# Patient Record
Sex: MALE | Race: BLACK OR AFRICAN AMERICAN | NOT HISPANIC OR LATINO | Employment: OTHER | RURAL
[De-identification: names, ages, dates, MRNs, and addresses within clinical notes are randomized per-mention and may not be internally consistent; named-entity substitution may affect disease eponyms.]

---

## 2022-11-28 ENCOUNTER — HOSPITAL ENCOUNTER (EMERGENCY)
Facility: HOSPITAL | Age: 62
Discharge: HOME | End: 2022-11-28
Payer: COMMERCIAL

## 2022-11-28 VITALS
WEIGHT: 185 LBS | HEIGHT: 70 IN | DIASTOLIC BLOOD PRESSURE: 79 MMHG | RESPIRATION RATE: 19 BRPM | TEMPERATURE: 98 F | BODY MASS INDEX: 26.48 KG/M2 | OXYGEN SATURATION: 95 % | SYSTOLIC BLOOD PRESSURE: 103 MMHG | HEART RATE: 87 BPM

## 2022-11-28 DIAGNOSIS — T18.128A ESOPHAGEAL OBSTRUCTION DUE TO FOOD IMPACTION: Primary | ICD-10-CM
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
## 2022-11-28 PROCEDURE — 63600175 PHARM REV CODE 636 W HCPCS: Performed by: NURSE PRACTITIONER

## 2022-11-28 PROCEDURE — 36000 PLACE NEEDLE IN VEIN: CPT

## 2022-11-28 PROCEDURE — 25000003 PHARM REV CODE 250: Performed by: NURSE PRACTITIONER

## 2022-11-28 PROCEDURE — 99284 EMERGENCY DEPT VISIT MOD MDM: CPT | Mod: ,,, | Performed by: NURSE PRACTITIONER

## 2022-11-28 PROCEDURE — 96372 THER/PROPH/DIAG INJ SC/IM: CPT | Performed by: NURSE PRACTITIONER

## 2022-11-28 PROCEDURE — 99284 EMERGENCY DEPT VISIT MOD MDM: CPT | Mod: 25

## 2022-11-28 PROCEDURE — 99284 PR EMERGENCY DEPT VISIT,LEVEL IV: ICD-10-PCS | Mod: ,,, | Performed by: NURSE PRACTITIONER

## 2022-11-28 RX ORDER — MAG HYDROX/ALUMINUM HYD/SIMETH 200-200-20
30 SUSPENSION, ORAL (FINAL DOSE FORM) ORAL ONCE
Status: COMPLETED | OUTPATIENT
Start: 2022-11-28 | End: 2022-11-28

## 2022-11-28 RX ORDER — GLUCAGON 1 MG
1 KIT INJECTION
Status: COMPLETED | OUTPATIENT
Start: 2022-11-28 | End: 2022-11-28

## 2022-11-28 RX ORDER — LOSARTAN POTASSIUM 100 MG/1
100 TABLET ORAL
COMMUNITY
End: 2024-03-04

## 2022-11-28 RX ORDER — MELOXICAM 7.5 MG/1
7.5 TABLET ORAL
COMMUNITY

## 2022-11-28 RX ORDER — NITROFURANTOIN 25; 75 MG/1; MG/1
50 CAPSULE ORAL DAILY
COMMUNITY

## 2022-11-28 RX ORDER — TAMSULOSIN HYDROCHLORIDE 0.4 MG/1
1 CAPSULE ORAL DAILY
COMMUNITY

## 2022-11-28 RX ORDER — HYDROCHLOROTHIAZIDE 25 MG/1
25 TABLET ORAL
COMMUNITY
End: 2024-03-04

## 2022-11-28 RX ORDER — LIDOCAINE HYDROCHLORIDE 20 MG/ML
15 SOLUTION OROPHARYNGEAL ONCE
Status: COMPLETED | OUTPATIENT
Start: 2022-11-28 | End: 2022-11-28

## 2022-11-28 RX ORDER — ATORVASTATIN CALCIUM 10 MG/1
10 TABLET, FILM COATED ORAL DAILY
COMMUNITY
End: 2023-08-28 | Stop reason: ALTCHOICE

## 2022-11-28 RX ORDER — ASPIRIN 81 MG/1
81 TABLET ORAL 2 TIMES DAILY
COMMUNITY

## 2022-11-28 RX ADMIN — LIDOCAINE HYDROCHLORIDE 15 ML: 20 SOLUTION ORAL at 06:11

## 2022-11-28 RX ADMIN — GLUCAGON HYDROCHLORIDE 1 MG: 1 INJECTION, POWDER, FOR SOLUTION INTRAMUSCULAR; INTRAVENOUS; SUBCUTANEOUS at 06:11

## 2022-11-28 RX ADMIN — ALUMINUM HYDROXIDE, MAGNESIUM HYDROXIDE, AND SIMETHICONE 30 ML: 200; 200; 20 SUSPENSION ORAL at 06:11

## 2022-11-29 DIAGNOSIS — R19.8 ABNORMAL FINDINGS ON ESOPHAGOGASTRODUODENOSCOPY (EGD): Primary | ICD-10-CM

## 2022-11-29 NOTE — ED PROVIDER NOTES
Encounter Date: 11/28/2022       History     Chief Complaint   Patient presents with    Foreign Body In Throat     Reports feeling like something is stuck in throat since choking while eating around 9pm last night. States unable to tolerate anything by mouth. No drooling/stridor at triage.     Patient presents to ER with complaint of food stuck in esophagus.   Patient state he feels like turkey or ham got stuck in his throat around 9 pm last night.  He states he has not been able to tolerate food or liquid since that time.  He is able to swallow saliva but has been nauseated.  He has vomited several times today.  Denies fever.  Reports similar symptoms but has never had to be treated or evaluated.      The history is provided by the patient and the spouse. No  was used.   Review of patient's allergies indicates:   Allergen Reactions    Levofloxacin in d5w Shortness Of Breath     No past medical history on file.  No past surgical history on file.  No family history on file.     Review of Systems   Constitutional:  Positive for activity change, appetite change and fatigue.   HENT:  Positive for trouble swallowing.    All other systems reviewed and are negative.    Physical Exam     Initial Vitals [11/28/22 1752]   BP Pulse Resp Temp SpO2   (!) 130/93 93 16 97.6 °F (36.4 °C) 95 %      MAP       --         Physical Exam    Nursing note and vitals reviewed.  Constitutional: Vital signs are normal. He appears well-developed and well-nourished. He is cooperative. He has a sickly appearance.   HENT:   Head: Normocephalic.   Right Ear: Hearing, tympanic membrane, external ear and ear canal normal.   Left Ear: Hearing, tympanic membrane, external ear and ear canal normal.   Nose: Nose normal.   Mouth/Throat: Uvula is midline and mucous membranes are normal. Posterior oropharyngeal erythema present.   Eyes: Conjunctivae and EOM are normal. Pupils are equal, round, and reactive to light.   Neck: Neck supple.    Normal range of motion.  Cardiovascular:  Normal rate, regular rhythm, normal heart sounds and intact distal pulses.           Pulmonary/Chest: Breath sounds normal.   Abdominal: Abdomen is soft and flat. Bowel sounds are normal. There is abdominal tenderness.   Musculoskeletal:         General: Normal range of motion.      Cervical back: Normal range of motion and neck supple.     Neurological: He is alert and oriented to person, place, and time. GCS score is 15. GCS eye subscore is 4. GCS verbal subscore is 5. GCS motor subscore is 6.   Skin: Skin is warm and dry. Capillary refill takes less than 2 seconds.   Psychiatric: He has a normal mood and affect. His behavior is normal. Judgment and thought content normal.       Medical Screening Exam   See Full Note    ED Course   Procedures  Labs Reviewed - No data to display       Imaging Results    None          Medications   glucagon (human recombinant) injection 1 mg (1 mg Intramuscular Given 11/28/22 1839)   aluminum-magnesium hydroxide-simethicone 200-200-20 mg/5 mL suspension 30 mL (30 mLs Oral Given 11/28/22 1840)     And   LIDOcaine HCl 2% oral solution 15 mL (15 mLs Oral Given 11/28/22 1840)     Medical Decision Making:   ED Management:  1mg glucagon given IV push, followed by mouth full of hot diet coke.  Patient's hands were raised above his head.  He was able to swallow drink.  Voiced relief of symptoms immediately.   1841 Dr Freed was called in consult  Spoke to Dr Freed.  Recommendation for referral, will work in for egd hopefully this week if insurance will allow.   Other:   I have discussed this case with another health care provider.       <> Summary of the Discussion: Discussed with Dr Freed.                 Clinical Impression:   Final diagnoses:  [K22.2, T18.128A] Esophageal obstruction due to food impaction (Primary)      ED Disposition Condition    Discharge Stable          ED Prescriptions    None       Follow-up Information       Follow up  With Specialties Details Why Contact Info    Fei Freed MD Gastroenterology Schedule an appointment as soon as possible for a visit   [address] [place] [id number]  [phone]               DEBI Kendrick  11/28/22 1909

## 2022-11-29 NOTE — DISCHARGE INSTRUCTIONS
Call Dr Freed's office in AM to schedule EGD with dilation.   Referral was sent from the ER.  Liquids or soft/ puree foods only until follow up. \  Return to ER with new or worsening symptoms.

## 2022-12-01 ENCOUNTER — ANESTHESIA (OUTPATIENT)
Dept: GASTROENTEROLOGY | Facility: HOSPITAL | Age: 62
End: 2022-12-01
Payer: COMMERCIAL

## 2022-12-01 ENCOUNTER — ANESTHESIA EVENT (OUTPATIENT)
Dept: GASTROENTEROLOGY | Facility: HOSPITAL | Age: 62
End: 2022-12-01
Payer: COMMERCIAL

## 2022-12-01 ENCOUNTER — HOSPITAL ENCOUNTER (OUTPATIENT)
Dept: GASTROENTEROLOGY | Facility: HOSPITAL | Age: 62
Discharge: HOME | End: 2022-12-01
Attending: INTERNAL MEDICINE
Payer: COMMERCIAL

## 2022-12-01 VITALS
TEMPERATURE: 98 F | RESPIRATION RATE: 18 BRPM | HEART RATE: 56 BPM | HEIGHT: 70 IN | SYSTOLIC BLOOD PRESSURE: 147 MMHG | WEIGHT: 180 LBS | DIASTOLIC BLOOD PRESSURE: 89 MMHG | BODY MASS INDEX: 25.77 KG/M2 | OXYGEN SATURATION: 98 %

## 2022-12-01 DIAGNOSIS — K21.00 GASTROESOPHAGEAL REFLUX DISEASE WITH ESOPHAGITIS WITHOUT HEMORRHAGE: Primary | ICD-10-CM

## 2022-12-01 DIAGNOSIS — R13.19 ESOPHAGEAL DYSPHAGIA: ICD-10-CM

## 2022-12-01 DIAGNOSIS — R19.8 ABNORMAL FINDINGS ON ESOPHAGOGASTRODUODENOSCOPY (EGD): ICD-10-CM

## 2022-12-01 PROCEDURE — 43239 EGD BIOPSY SINGLE/MULTIPLE: CPT | Mod: 59 | Performed by: INTERNAL MEDICINE

## 2022-12-01 PROCEDURE — 88305 TISSUE EXAM BY PATHOLOGIST: CPT | Mod: TC,SUR | Performed by: INTERNAL MEDICINE

## 2022-12-01 PROCEDURE — 88312 SURGICAL PATHOLOGY: ICD-10-PCS | Mod: 26,,, | Performed by: PATHOLOGY

## 2022-12-01 PROCEDURE — 43249 ESOPH EGD DILATION <30 MM: CPT | Mod: ,,, | Performed by: INTERNAL MEDICINE

## 2022-12-01 PROCEDURE — 88305 TISSUE EXAM BY PATHOLOGIST: CPT | Mod: 26,,, | Performed by: PATHOLOGY

## 2022-12-01 PROCEDURE — D9220A PRA ANESTHESIA: ICD-10-PCS | Mod: ,,, | Performed by: NURSE ANESTHETIST, CERTIFIED REGISTERED

## 2022-12-01 PROCEDURE — 63600175 PHARM REV CODE 636 W HCPCS: Performed by: NURSE ANESTHETIST, CERTIFIED REGISTERED

## 2022-12-01 PROCEDURE — 25000003 PHARM REV CODE 250: Performed by: NURSE ANESTHETIST, CERTIFIED REGISTERED

## 2022-12-01 PROCEDURE — 43239 PR EGD, FLEX, W/BIOPSY, SGL/MULTI: ICD-10-PCS | Mod: 59,,, | Performed by: INTERNAL MEDICINE

## 2022-12-01 PROCEDURE — 37000008 HC ANESTHESIA 1ST 15 MINUTES

## 2022-12-01 PROCEDURE — 88305 SURGICAL PATHOLOGY: ICD-10-PCS | Mod: 26,,, | Performed by: PATHOLOGY

## 2022-12-01 PROCEDURE — 43249 ESOPH EGD DILATION <30 MM: CPT | Performed by: INTERNAL MEDICINE

## 2022-12-01 PROCEDURE — D9220A PRA ANESTHESIA: Mod: ,,, | Performed by: NURSE ANESTHETIST, CERTIFIED REGISTERED

## 2022-12-01 PROCEDURE — 88312 SPECIAL STAINS GROUP 1: CPT | Mod: 26,,, | Performed by: PATHOLOGY

## 2022-12-01 PROCEDURE — 27000284 HC CANNULA NASAL: Performed by: NURSE ANESTHETIST, CERTIFIED REGISTERED

## 2022-12-01 PROCEDURE — 37000009 HC ANESTHESIA EA ADD 15 MINS

## 2022-12-01 PROCEDURE — 43249 PR EGD, FLEX, W/BALL DILATION, < 30MM: ICD-10-PCS | Mod: ,,, | Performed by: INTERNAL MEDICINE

## 2022-12-01 PROCEDURE — 43239 EGD BIOPSY SINGLE/MULTIPLE: CPT | Mod: 59,,, | Performed by: INTERNAL MEDICINE

## 2022-12-01 PROCEDURE — C1726 CATH, BAL DIL, NON-VASCULAR: HCPCS

## 2022-12-01 RX ORDER — OMEPRAZOLE 40 MG/1
40 CAPSULE, DELAYED RELEASE ORAL DAILY
Qty: 90 CAPSULE | Refills: 3 | Status: SHIPPED | OUTPATIENT
Start: 2022-12-01 | End: 2024-03-04

## 2022-12-01 RX ORDER — LIDOCAINE HYDROCHLORIDE 20 MG/ML
INJECTION, SOLUTION EPIDURAL; INFILTRATION; INTRACAUDAL; PERINEURAL
Status: DISCONTINUED | OUTPATIENT
Start: 2022-12-01 | End: 2022-12-01

## 2022-12-01 RX ORDER — PROPOFOL 10 MG/ML
VIAL (ML) INTRAVENOUS
Status: DISCONTINUED | OUTPATIENT
Start: 2022-12-01 | End: 2022-12-01

## 2022-12-01 RX ADMIN — PROPOFOL 50 MG: 10 INJECTION, EMULSION INTRAVENOUS at 08:12

## 2022-12-01 RX ADMIN — SODIUM CHLORIDE: 9 INJECTION, SOLUTION INTRAVENOUS at 08:12

## 2022-12-01 RX ADMIN — LIDOCAINE HYDROCHLORIDE 50 MG: 20 INJECTION, SOLUTION INTRAVENOUS at 08:12

## 2022-12-01 NOTE — H&P
"Gastroenterology Pre-procedure H&P    Chief Complaint: Esophageal dysphagia    History of Present Illness    Bradly Coffey is a 62 y.o. male that  has a past medical history of Arthritis and Hypertension. Patient presented to ER earlier this week for a likely food bolus (points to xiphoid) that passed after glucagon and physical maneuvers.     Past Medical History:   Diagnosis Date    Arthritis     Hypertension        Past Surgical History:   Procedure Laterality Date    RENAL BIOPSY         History reviewed. No pertinent family history.    Social History     Socioeconomic History    Marital status:    Tobacco Use    Smoking status: Never    Smokeless tobacco: Current     Types: Chew   Substance and Sexual Activity    Alcohol use: Yes     Comment: 6 pack every 2 weeks    Drug use: Never       Current Outpatient Medications   Medication Sig Dispense Refill    aspirin (ECOTRIN) 81 MG EC tablet Take 81 mg by mouth.      atorvastatin (LIPITOR) 10 MG tablet Take 10 mg by mouth once daily.      hydroCHLOROthiazide (HYDRODIURIL) 25 MG tablet Take 25 mg by mouth.      losartan (COZAAR) 100 MG tablet Take 100 mg by mouth.      meloxicam (MOBIC) 7.5 MG tablet Take 7.5 mg by mouth.      nitrofurantoin, macrocrystal-monohydrate, (MACROBID) 100 MG capsule Take 50 mg by mouth once daily.      tamsulosin (FLOMAX) 0.4 mg Cap Take 1 capsule by mouth once daily.       No current facility-administered medications for this encounter.       Review of patient's allergies indicates:   Allergen Reactions    Levofloxacin in d5w Shortness Of Breath       Objective:  Vitals:    12/01/22 0724   BP: (!) 156/81   Pulse: 66   Resp: 12   SpO2: 97%   Weight: 81.6 kg (180 lb)   Height: 5' 10" (1.778 m)        GEN: normal appearing, NAD, AAO x3  HENT: NCAT, anicteric, OP benign  CV: normal rate, regular rhythm  RESP: NABS, symmetric rise, unlabored  ABD: soft, ND, no guarding or TTP  SKIN: warm and dry  NEURO: grossly afocal    Assessment " and Plan:    Proceed with:    EGD for esophageal dysphagia     Fei Freed MD  Gastroenterology

## 2022-12-01 NOTE — DISCHARGE INSTRUCTIONS
Procedure Date  12/1/22     Impression  Overall Impression:   - Benign appearing, likely peptic stricture in the lower esophagus, dilated to 13.5-mm; biopsied   - The esophagus, stomach, and examined duodenum were otherwise normal  - No mass, hiatal hernia, peptic ulcer     Recommendation    Await pathology results  Start Prilosec 40 mg daily - indefinitely      Schedule repeat EGD in the next 2-3 weeks for repeat dilation      Outcome of procedure: successful EGD   Disposition: patient to recovery following procedure; discharge to home when appropriate parameters met  Provisions for follow up: please call my office for any unexpected symptoms like chest or abdominal pain or bleeding following your procedure.  Final Diagnosis: GERD, peptic stricture    THE NURSE WILL CALL YOU WITH YOUR BIOPSY RESULTS IN A FEW DAYS.     NO DRIVING, OPERATING EQUIPMENT, OR SIGNING LEGAL DOCUMENTS FOR 24 HOURS.

## 2022-12-01 NOTE — ANESTHESIA PREPROCEDURE EVALUATION
12/01/2022  Bradly Coffey is a 62 y.o., male.      Pre-op Assessment    I have reviewed the Patient Summary Reports.     I have reviewed the Nursing Notes. I have reviewed the NPO Status.   I have reviewed the Medications.     Review of Systems  Anesthesia Hx:  No problems with previous Anesthesia Denies Hx of Anesthetic complications  Denies Family Hx of Anesthesia complications.   Denies Personal Hx of Anesthesia complications.   Social:  Non-Smoker    Hematology/Oncology:  Hematology Normal   Oncology Normal     EENT/Dental:EENT/Dental Normal   Cardiovascular:   Hypertension ECG has been reviewed.    Pulmonary:  Pulmonary Normal    Renal/:  Renal/ Normal     Musculoskeletal:  Musculoskeletal Normal    Neurological:  Neurology Normal    Dermatological:  Skin Normal    Psych:  Psychiatric Normal           Physical Exam  General: Well nourished    Airway:  Mallampati: II   Mouth Opening: Normal  TM Distance: Normal  Tongue: Normal  Neck ROM: Normal ROM    Dental:  Intact        Anesthesia Plan  Type of Anesthesia, risks & benefits discussed:    Anesthesia Type: Gen Natural Airway  Intra-op Monitoring Plan: Standard ASA Monitors  Post Op Pain Control Plan: multimodal analgesia  Induction:  IV  Informed Consent: Informed consent signed with the Patient and all parties understand the risks and agree with anesthesia plan.  All questions answered. Patient consented to blood products? Yes  ASA Score: 2  Day of Surgery Review of History & Physical: H&P Update referred to the surgeon/provider.I have interviewed and examined the patient. I have reviewed the patient's H&P dated: There are no significant changes.     Ready For Surgery From Anesthesia Perspective.     .

## 2022-12-01 NOTE — ANESTHESIA POSTPROCEDURE EVALUATION
Anesthesia Post Evaluation    Patient: Bradly Coffey    Procedure(s) Performed: EGD  Final Anesthesia Type: general      Patient location during evaluation: GI PACU  Patient participation: Yes- Able to Participate  Level of consciousness: awake and alert and oriented  Post-procedure vital signs: reviewed and stable  Pain management: adequate  Airway patency: patent    PONV status at discharge: No PONV  Anesthetic complications: no      Cardiovascular status: blood pressure returned to baseline and stable  Respiratory status: unassisted, spontaneous ventilation and room air  Hydration status: euvolemic  Follow-up not needed.          Vitals Value Taken Time   /78 12/01/22 0838   Temp 36.6 12/01/22 0838   Pulse 60 12/01/22 0838   Resp 16 12/01/22 0838   SpO2 97 12/01/22 0838         No case tracking events are documented in the log.      Pain/Bonita Score: No data recorded

## 2022-12-01 NOTE — TRANSFER OF CARE
"Anesthesia Transfer of Care Note    Patient: Bradly Coffey    Procedure(s) Performed: EGD    Patient location: GI    Anesthesia Type: general    Transport from OR: Transported from OR on room air with adequate spontaneous ventilation. Continuous ECG monitoring in transport. Continuous SpO2 monitoring in transport    Post pain: adequate analgesia    Post assessment: no apparent anesthetic complications    Post vital signs: stable    Level of consciousness: responds to stimulation, awake and sedated    Nausea/Vomiting: no nausea/vomiting    Complications: none    Transfer of care protocol was followed      Last vitals:   Visit Vitals  /78 (BP Location: Left arm, Patient Position: Lying)   Pulse 62   Temp 36.6 °C (97.9 °F) (Oral)   Resp 16   Ht 5' 10" (1.778 m)   Wt 81.6 kg (180 lb)   SpO2 97%   BMI 25.83 kg/m²     "

## 2022-12-02 LAB
ESTROGEN SERPL-MCNC: NORMAL PG/ML
INSULIN SERPL-ACNC: NORMAL U[IU]/ML
LAB AP GROSS DESCRIPTION: NORMAL
LAB AP LABORATORY NOTES: NORMAL
T3RU NFR SERPL: NORMAL %

## 2022-12-13 ENCOUNTER — ANESTHESIA EVENT (OUTPATIENT)
Dept: GASTROENTEROLOGY | Facility: HOSPITAL | Age: 62
End: 2022-12-13
Payer: COMMERCIAL

## 2022-12-13 ENCOUNTER — HOSPITAL ENCOUNTER (OUTPATIENT)
Dept: GASTROENTEROLOGY | Facility: HOSPITAL | Age: 62
Discharge: HOME | End: 2022-12-13
Attending: INTERNAL MEDICINE
Payer: COMMERCIAL

## 2022-12-13 ENCOUNTER — ANESTHESIA (OUTPATIENT)
Dept: GASTROENTEROLOGY | Facility: HOSPITAL | Age: 62
End: 2022-12-13
Payer: COMMERCIAL

## 2022-12-13 VITALS
SYSTOLIC BLOOD PRESSURE: 104 MMHG | HEART RATE: 62 BPM | OXYGEN SATURATION: 94 % | TEMPERATURE: 98 F | DIASTOLIC BLOOD PRESSURE: 63 MMHG | RESPIRATION RATE: 19 BRPM

## 2022-12-13 DIAGNOSIS — K22.2 BENIGN ESOPHAGEAL STRICTURE: ICD-10-CM

## 2022-12-13 PROCEDURE — 63600175 PHARM REV CODE 636 W HCPCS: Performed by: NURSE ANESTHETIST, CERTIFIED REGISTERED

## 2022-12-13 PROCEDURE — D9220A PRA ANESTHESIA: ICD-10-PCS | Mod: ,,, | Performed by: NURSE ANESTHETIST, CERTIFIED REGISTERED

## 2022-12-13 PROCEDURE — 43249 ESOPH EGD DILATION <30 MM: CPT | Mod: ,,, | Performed by: INTERNAL MEDICINE

## 2022-12-13 PROCEDURE — 43249 PR EGD, FLEX, W/BALL DILATION, < 30MM: ICD-10-PCS | Mod: ,,, | Performed by: INTERNAL MEDICINE

## 2022-12-13 PROCEDURE — 27000284 HC CANNULA NASAL: Performed by: NURSE ANESTHETIST, CERTIFIED REGISTERED

## 2022-12-13 PROCEDURE — 37000008 HC ANESTHESIA 1ST 15 MINUTES

## 2022-12-13 PROCEDURE — 43249 ESOPH EGD DILATION <30 MM: CPT | Performed by: INTERNAL MEDICINE

## 2022-12-13 PROCEDURE — D9220A PRA ANESTHESIA: Mod: ,,, | Performed by: NURSE ANESTHETIST, CERTIFIED REGISTERED

## 2022-12-13 PROCEDURE — 25000003 PHARM REV CODE 250: Performed by: NURSE ANESTHETIST, CERTIFIED REGISTERED

## 2022-12-13 PROCEDURE — C1726 CATH, BAL DIL, NON-VASCULAR: HCPCS

## 2022-12-13 RX ORDER — PROPOFOL 10 MG/ML
VIAL (ML) INTRAVENOUS
Status: DISCONTINUED | OUTPATIENT
Start: 2022-12-13 | End: 2022-12-13

## 2022-12-13 RX ORDER — LIDOCAINE HYDROCHLORIDE 20 MG/ML
INJECTION INTRAVENOUS
Status: DISCONTINUED | OUTPATIENT
Start: 2022-12-13 | End: 2022-12-13

## 2022-12-13 RX ORDER — FENTANYL CITRATE 50 UG/ML
INJECTION, SOLUTION INTRAMUSCULAR; INTRAVENOUS
Status: DISCONTINUED | OUTPATIENT
Start: 2022-12-13 | End: 2022-12-13

## 2022-12-13 RX ADMIN — LIDOCAINE HYDROCHLORIDE 50 MG: 20 INJECTION, SOLUTION INTRAVENOUS at 09:12

## 2022-12-13 RX ADMIN — PROPOFOL 25 MG: 10 INJECTION, EMULSION INTRAVENOUS at 10:12

## 2022-12-13 RX ADMIN — SODIUM CHLORIDE: 9 INJECTION, SOLUTION INTRAVENOUS at 09:12

## 2022-12-13 RX ADMIN — PROPOFOL 50 MG: 10 INJECTION, EMULSION INTRAVENOUS at 09:12

## 2022-12-13 RX ADMIN — FENTANYL CITRATE 100 MCG: 50 INJECTION INTRAMUSCULAR; INTRAVENOUS at 09:12

## 2022-12-13 RX ADMIN — PROPOFOL 25 MG: 10 INJECTION, EMULSION INTRAVENOUS at 09:12

## 2022-12-13 NOTE — TRANSFER OF CARE
Anesthesia Transfer of Care Note    Patient: Bradly Coffey    Procedure(s) Performed: EGD with dilation    Patient location: PACU    Anesthesia Type: general    Transport from OR: Transported from OR on room air with adequate spontaneous ventilation    Post pain: adequate analgesia    Post assessment: no apparent anesthetic complications    Post vital signs: stable    Level of consciousness: sedated    Nausea/Vomiting: no nausea/vomiting    Complications: none    Transfer of care protocol was followed      Last vitals:   Visit Vitals  /72 (Patient Position: Lying)   Pulse 66   Temp 36.6 °C (97.8 °F) (Skin)   Resp 17   SpO2 97%

## 2022-12-13 NOTE — ANESTHESIA PREPROCEDURE EVALUATION
12/13/2022  Bradly Coffey is a 62 y.o., male.      Pre-op Assessment    I have reviewed the Patient Summary Reports.     I have reviewed the Nursing Notes. I have reviewed the NPO Status.   I have reviewed the Medications.     Review of Systems  Anesthesia Hx:  No problems with previous Anesthesia  Denies Family Hx of Anesthesia complications.   Denies Personal Hx of Anesthesia complications.   Social:  Non-Smoker    Hematology/Oncology:  Hematology Normal   Oncology Normal     EENT/Dental:EENT/Dental Normal   Cardiovascular:   Hypertension hyperlipidemia ECG has been reviewed.    Pulmonary:  Pulmonary Normal    Renal/:   BPH    Musculoskeletal:  Musculoskeletal Normal    Neurological:  Neurology Normal    Dermatological:  Skin Normal    Psych:  Psychiatric Normal           Physical Exam  General: Well nourished, Cooperative, Alert and Oriented    Airway:  Mallampati: II   Mouth Opening: Normal  TM Distance: Normal  Tongue: Normal  Neck ROM: Normal ROM    Dental:  Intact    Chest/Lungs:  Normal Respiratory Rate    Heart:  Rate: Normal  Rhythm: Regular Rhythm        Anesthesia Plan  Type of Anesthesia, risks & benefits discussed:    Anesthesia Type: Gen Natural Airway  Intra-op Monitoring Plan: Standard ASA Monitors  Post Op Pain Control Plan: multimodal analgesia  Induction:  IV  Informed Consent: Informed consent signed with the Patient and all parties understand the risks and agree with anesthesia plan.  All questions answered. Patient consented to blood products? Yes  ASA Score: 2  Day of Surgery Review of History & Physical: H&P Update referred to the surgeon/provider.I have interviewed and examined the patient. I have reviewed the patient's H&P dated: There are no significant changes.     Ready For Surgery From Anesthesia Perspective.     .    Past Medical History:   Diagnosis Date    Arthritis      Hypertension        Current Outpatient Medications   Medication Sig    aspirin (ECOTRIN) 81 MG EC tablet Take 81 mg by mouth.    atorvastatin (LIPITOR) 10 MG tablet Take 10 mg by mouth once daily.    hydroCHLOROthiazide (HYDRODIURIL) 25 MG tablet Take 25 mg by mouth.    losartan (COZAAR) 100 MG tablet Take 100 mg by mouth.    meloxicam (MOBIC) 7.5 MG tablet Take 7.5 mg by mouth.    nitrofurantoin, macrocrystal-monohydrate, (MACROBID) 100 MG capsule Take 50 mg by mouth once daily.    omeprazole (PRILOSEC) 40 MG capsule Take 1 capsule (40 mg total) by mouth once daily.    tamsulosin (FLOMAX) 0.4 mg Cap Take 1 capsule by mouth once daily.     No current facility-administered medications for this encounter.

## 2022-12-13 NOTE — H&P
Gastroenterology Pre-procedure H&P    Chief Complaint: Benign esophageal stricture    History of Present Illness    Bradly Coffey is a 62 y.o. male that  has a past medical history of Arthritis and Hypertension.     Patient with h/o esophageal dysphagia with index EGD significant for a benign appearing 10-11-mm stricture in the distal esophagus dilated with BS balloon dilator to 13.5-mm with mucosal disruption. Here for serial dilation.      Past Medical History:   Diagnosis Date    Arthritis     Hypertension        Past Surgical History:   Procedure Laterality Date    RENAL BIOPSY         History reviewed. No pertinent family history.    Social History     Socioeconomic History    Marital status:    Tobacco Use    Smoking status: Never    Smokeless tobacco: Current     Types: Chew   Substance and Sexual Activity    Alcohol use: Yes     Comment: 6 pack every 2 weeks    Drug use: Never       Current Outpatient Medications   Medication Sig Dispense Refill    aspirin (ECOTRIN) 81 MG EC tablet Take 81 mg by mouth.      atorvastatin (LIPITOR) 10 MG tablet Take 10 mg by mouth once daily.      hydroCHLOROthiazide (HYDRODIURIL) 25 MG tablet Take 25 mg by mouth.      losartan (COZAAR) 100 MG tablet Take 100 mg by mouth.      meloxicam (MOBIC) 7.5 MG tablet Take 7.5 mg by mouth.      nitrofurantoin, macrocrystal-monohydrate, (MACROBID) 100 MG capsule Take 50 mg by mouth once daily.      omeprazole (PRILOSEC) 40 MG capsule Take 1 capsule (40 mg total) by mouth once daily. 90 capsule 3    tamsulosin (FLOMAX) 0.4 mg Cap Take 1 capsule by mouth once daily.       No current facility-administered medications for this encounter.       Review of patient's allergies indicates:   Allergen Reactions    Levofloxacin in d5w Shortness Of Breath       Objective:  Vitals:    12/13/22 0848   BP: (!) 141/79   Pulse: 68   Resp: 12   SpO2: 98%        GEN: normal appearing, NAD, AAO x3  HENT: NCAT, anicteric, OP benign  CV: normal rate,  regular rhythm  RESP: NABS, symmetric rise, unlabored  ABD: soft, ND, no guarding or TTP  SKIN: warm and dry  NEURO: grossly afocal    Assessment and Plan:    Proceed with:    EGD for dilation of peptic esophageal stricture      Fei Freed MD  Gastroenterology

## 2022-12-13 NOTE — DISCHARGE INSTRUCTIONS
Procedure Date  12/13/22     Impression  Overall Impression:   - Benign peptic stricture in the distal esophagus, dilated to 15-mm with mucosal disruption  - The esophagus was otherwise normal     Recommendation    Follow up with me in clinic in 1-2 months to assess symptoms; if still having symptoms, will schedule you for another EGD with plans for dilation up to 18-mm if able  Continue daily PPI therapy indefinitely       Outcome of procedure: successful EGD dilation  Disposition: patient to recovery following procedure; discharge to home when appropriate parameters met  Provisions for follow up: please call my office for any unexpected symptoms like chest or abdominal pain or bleeding following your procedure.  Final Diagnosis: peptic stricture   Drink fluids, no operating heavy machinery, driving, or signing legal documents until tomorrow.

## 2022-12-13 NOTE — ANESTHESIA POSTPROCEDURE EVALUATION
Anesthesia Post Evaluation    Patient: Bradly Coffey    Procedure(s) Performed: EGD with Dilation    Final Anesthesia Type: general      Patient location during evaluation: GI PACU  Patient participation: Yes- Able to Participate  Level of consciousness: awake and alert  Post-procedure vital signs: reviewed and stable  Pain management: adequate  Airway patency: patent    PONV status at discharge: No PONV  Anesthetic complications: no      Cardiovascular status: blood pressure returned to baseline and hemodynamically stable  Respiratory status: spontaneous ventilation, unassisted and room air  Hydration status: euvolemic  Follow-up not needed.          Vitals Value Taken Time   /63 12/13/22 1041   Temp 36.6 °C (97.8 °F) 12/13/22 1006   Pulse 62 12/13/22 1041   Resp 18 12/13/22 1041   SpO2 94 % 12/13/22 1041   Vitals shown include unvalidated device data.      Event Time   Out of Recovery 10:38:52         Pain/Bonita Score: Bonita Score: 9 (12/13/2022 10:08 AM)

## 2023-08-09 DIAGNOSIS — R94.39 ABNORMAL CARDIOVASCULAR STRESS TEST: Primary | ICD-10-CM

## 2023-08-14 DIAGNOSIS — R07.9 CHEST PAIN, UNSPECIFIED TYPE: Primary | ICD-10-CM

## 2023-08-28 ENCOUNTER — OFFICE VISIT (OUTPATIENT)
Dept: CARDIOLOGY | Facility: CLINIC | Age: 63
End: 2023-08-28
Payer: MEDICARE

## 2023-08-28 VITALS
WEIGHT: 195 LBS | BODY MASS INDEX: 27.92 KG/M2 | RESPIRATION RATE: 18 BRPM | SYSTOLIC BLOOD PRESSURE: 120 MMHG | HEIGHT: 70 IN | DIASTOLIC BLOOD PRESSURE: 62 MMHG | HEART RATE: 72 BPM

## 2023-08-28 DIAGNOSIS — R06.02 SOB (SHORTNESS OF BREATH): ICD-10-CM

## 2023-08-28 DIAGNOSIS — R07.9 CHEST PAIN, UNSPECIFIED TYPE: ICD-10-CM

## 2023-08-28 PROCEDURE — 93010 EKG 12-LEAD: ICD-10-PCS | Mod: S$PBB,,, | Performed by: STUDENT IN AN ORGANIZED HEALTH CARE EDUCATION/TRAINING PROGRAM

## 2023-08-28 PROCEDURE — 99204 OFFICE O/P NEW MOD 45 MIN: CPT | Mod: S$PBB,,, | Performed by: STUDENT IN AN ORGANIZED HEALTH CARE EDUCATION/TRAINING PROGRAM

## 2023-08-28 PROCEDURE — 99204 PR OFFICE/OUTPT VISIT, NEW, LEVL IV, 45-59 MIN: ICD-10-PCS | Mod: S$PBB,,, | Performed by: STUDENT IN AN ORGANIZED HEALTH CARE EDUCATION/TRAINING PROGRAM

## 2023-08-28 PROCEDURE — 93010 ELECTROCARDIOGRAM REPORT: CPT | Mod: S$PBB,,, | Performed by: STUDENT IN AN ORGANIZED HEALTH CARE EDUCATION/TRAINING PROGRAM

## 2023-08-28 PROCEDURE — 99215 OFFICE O/P EST HI 40 MIN: CPT | Mod: PBBFAC | Performed by: STUDENT IN AN ORGANIZED HEALTH CARE EDUCATION/TRAINING PROGRAM

## 2023-08-28 PROCEDURE — 93005 ELECTROCARDIOGRAM TRACING: CPT | Mod: PBBFAC | Performed by: STUDENT IN AN ORGANIZED HEALTH CARE EDUCATION/TRAINING PROGRAM

## 2023-08-28 RX ORDER — PRAVASTATIN SODIUM 20 MG/1
20 TABLET ORAL DAILY
COMMUNITY
End: 2024-03-04

## 2023-08-28 RX ORDER — ZINC GLUCONATE 50 MG
50 TABLET ORAL DAILY
COMMUNITY

## 2023-08-28 RX ORDER — TESTOSTERONE CYPIONATE 200 MG/ML
INJECTION, SOLUTION INTRAMUSCULAR
COMMUNITY
Start: 2023-08-07

## 2023-08-28 RX ORDER — FAMOTIDINE 20 MG/1
20 TABLET, FILM COATED ORAL DAILY PRN
COMMUNITY
End: 2024-03-04

## 2023-08-28 RX ORDER — NITROFURANTOIN MACROCRYSTALS 50 MG/1
50 CAPSULE ORAL
COMMUNITY
Start: 2023-08-12 | End: 2024-03-04

## 2023-08-28 RX ORDER — AMOXICILLIN 500 MG
CAPSULE ORAL DAILY
COMMUNITY

## 2023-08-28 RX ORDER — EPINEPHRINE 0.22MG
100 AEROSOL WITH ADAPTER (ML) INHALATION DAILY
COMMUNITY

## 2023-08-28 RX ORDER — METFORMIN HYDROCHLORIDE 500 MG/1
500 TABLET ORAL 2 TIMES DAILY
COMMUNITY
Start: 2023-08-04

## 2023-08-28 RX ORDER — MAGNESIUM HYDROXIDE 400 MG/5ML
50000 SUSPENSION, ORAL (FINAL DOSE FORM) ORAL DAILY
COMMUNITY

## 2023-08-28 RX ORDER — MONTELUKAST SODIUM 10 MG/1
10 TABLET ORAL NIGHTLY
COMMUNITY

## 2023-08-28 RX ORDER — AMLODIPINE BESYLATE 5 MG/1
5 TABLET ORAL
COMMUNITY
Start: 2023-06-05 | End: 2024-03-04

## 2023-08-28 RX ORDER — PREDNISONE 50 MG/1
50 TABLET ORAL
COMMUNITY
Start: 2023-07-19 | End: 2024-03-04

## 2023-08-28 RX ORDER — SERTRALINE HYDROCHLORIDE 50 MG/1
50 TABLET, FILM COATED ORAL
COMMUNITY
Start: 2023-08-16

## 2023-08-28 RX ORDER — IBUPROFEN 100 MG/5ML
1000 SUSPENSION, ORAL (FINAL DOSE FORM) ORAL DAILY
COMMUNITY

## 2023-08-28 NOTE — PROGRESS NOTES
"PCP: No, Primary Doctor    Referring Provider:     Subjective:   Bradly Coffey is a 63 y.o. male with hx of HTN, HLD, DM  who presents for evaluation of chest pain.    Patient reports few months of early am chest discomfort that last a few minutes. Also has SOB and fatigue. Reports minimal activity    Labs reviewed - normal    Fhx: non-contributary  Shx:Denies any smoking, etoh or drug use     EKG - 8/28/23 - NSR. Twabnl, inferior ischemia        No results found for: "NA", "K", "CL", "CO2", "BUN", "CREATININE", "CALCIUM", "ANIONGAP", "ESTGFRAFRICA", "EGFRNONAA"    No results found for: "CHOL"  No results found for: "HDL"  No results found for: "LDLCALC"  No results found for: "TRIG"  No results found for: "CHOLHDL"    No results found for: "WBC", "HGB", "HCT", "MCV", "PLT"        Current Outpatient Medications:     amLODIPine (NORVASC) 5 MG tablet, Take 5 mg by mouth., Disp: , Rfl:     ascorbic acid, vitamin C, (VITAMIN C) 1000 MG tablet, Take 1,000 mg by mouth once daily., Disp: , Rfl:     aspirin (ECOTRIN) 81 MG EC tablet, Take 81 mg by mouth., Disp: , Rfl:     coenzyme Q10 100 mg capsule, Take 100 mg by mouth once daily., Disp: , Rfl:     cyanocobalamin, vitamin B-12, 5,000 mcg TbDL, Take 50,000 mcg by mouth Daily., Disp: , Rfl:     famotidine (PEPCID) 20 MG tablet, Take 20 mg by mouth daily as needed for Heartburn., Disp: , Rfl:     hydroCHLOROthiazide (HYDRODIURIL) 25 MG tablet, Take 25 mg by mouth., Disp: , Rfl:     losartan (COZAAR) 100 MG tablet, Take 100 mg by mouth., Disp: , Rfl:     meloxicam (MOBIC) 7.5 MG tablet, Take 7.5 mg by mouth., Disp: , Rfl:     metFORMIN (GLUCOPHAGE) 500 MG tablet, Take 500 mg by mouth 2 (two) times daily., Disp: , Rfl:     montelukast (SINGULAIR) 10 mg tablet, Take 10 mg by mouth every evening., Disp: , Rfl:     nitrofurantoin (MACRODANTIN) 50 MG capsule, Take 50 mg by mouth., Disp: , Rfl:     nitrofurantoin, macrocrystal-monohydrate, (MACROBID) 100 MG capsule, Take 50 mg by " "mouth once daily., Disp: , Rfl:     omega-3 fatty acids/fish oil (FISH OIL-OMEGA-3 FATTY ACIDS) 300-1,000 mg capsule, Take by mouth once daily., Disp: , Rfl:     omeprazole (PRILOSEC) 40 MG capsule, Take 1 capsule (40 mg total) by mouth once daily., Disp: 90 capsule, Rfl: 3    pravastatin (PRAVACHOL) 20 MG tablet, Take 20 mg by mouth once daily., Disp: , Rfl:     sertraline (ZOLOFT) 50 MG tablet, Take 50 mg by mouth., Disp: , Rfl:     tamsulosin (FLOMAX) 0.4 mg Cap, Take 1 capsule by mouth once daily., Disp: , Rfl:     testosterone cypionate (DEPOTESTOTERONE CYPIONATE) 200 mg/mL injection, Inject 1/2 ml intramuscularly once every two weeks, Disp: , Rfl:     vitamin D3-folic acid 125 mcg (5,000 unit)-1 mg Tab, Take 125 mcg by mouth Daily., Disp: , Rfl:     zinc gluconate 50 mg tablet, Take 50 mg by mouth once daily., Disp: , Rfl:     predniSONE (DELTASONE) 50 MG Tab, Take 50 mg by mouth., Disp: , Rfl:     Review of Systems   Respiratory:  Positive for shortness of breath. Negative for cough.    Cardiovascular:  Positive for chest pain. Negative for palpitations, orthopnea, claudication, leg swelling and PND.         Objective:   /62   Pulse 72   Resp 18   Ht 5' 10" (1.778 m)   Wt 88.5 kg (195 lb)   BMI 27.98 kg/m²     Physical Exam  Vitals and nursing note reviewed.   Constitutional:       Appearance: Normal appearance.   Cardiovascular:      Rate and Rhythm: Normal rate and regular rhythm.      Pulses: Normal pulses.      Heart sounds: Normal heart sounds.   Pulmonary:      Breath sounds: Normal breath sounds.   Neurological:      Mental Status: He is alert and oriented to person, place, and time.           Assessment:     1. Chest pain, unspecified type  EKG 12-lead    Echo    NM Myocardial Perfusion Spect Multi Exer    Nuclear Stress Test      2. SOB (shortness of breath)              Plan:   Chest pain  Atypical - hx of GERD  However risk factors present  On amlodipine, aspirin and statin  Treadmill " nuclear and ECHO    SOB (shortness of breath)  Chronic, NYHA III  ECHO ordered  CBC and TSh normal  Likely deconditioning

## 2023-08-28 NOTE — ASSESSMENT & PLAN NOTE
Atypical - hx of GERD  However risk factors present  On amlodipine, aspirin and statin  Treadmill nuclear and ECHO

## 2023-09-13 ENCOUNTER — HOSPITAL ENCOUNTER (OUTPATIENT)
Dept: CARDIOLOGY | Facility: HOSPITAL | Age: 63
Discharge: HOME | End: 2023-09-13
Attending: STUDENT IN AN ORGANIZED HEALTH CARE EDUCATION/TRAINING PROGRAM
Payer: MEDICARE

## 2023-09-13 ENCOUNTER — HOSPITAL ENCOUNTER (OUTPATIENT)
Dept: RADIOLOGY | Facility: HOSPITAL | Age: 63
Discharge: HOME | End: 2023-09-13
Attending: STUDENT IN AN ORGANIZED HEALTH CARE EDUCATION/TRAINING PROGRAM
Payer: MEDICARE

## 2023-09-13 ENCOUNTER — HOSPITAL ENCOUNTER (OUTPATIENT)
Dept: RADIOLOGY | Facility: HOSPITAL | Age: 63
Discharge: HOME | End: 2023-09-13
Attending: STUDENT IN AN ORGANIZED HEALTH CARE EDUCATION/TRAINING PROGRAM
Payer: COMMERCIAL

## 2023-09-13 VITALS — BODY MASS INDEX: 27.92 KG/M2 | WEIGHT: 195 LBS | HEIGHT: 70 IN

## 2023-09-13 DIAGNOSIS — R07.9 CHEST PAIN, UNSPECIFIED TYPE: ICD-10-CM

## 2023-09-13 PROCEDURE — 93306 TTE W/DOPPLER COMPLETE: CPT

## 2023-09-13 PROCEDURE — 93017 CV STRESS TEST TRACING ONLY: CPT

## 2023-09-13 PROCEDURE — 78452 HT MUSCLE IMAGE SPECT MULT: CPT | Mod: TC

## 2023-09-13 PROCEDURE — 78452 NM MYOCARDIAL PERFUSION SPECT MULTI PHARM: ICD-10-PCS | Mod: 26,,, | Performed by: STUDENT IN AN ORGANIZED HEALTH CARE EDUCATION/TRAINING PROGRAM

## 2023-09-13 PROCEDURE — 93306 ECHO (CUPID ONLY): ICD-10-PCS | Mod: 26,,, | Performed by: STUDENT IN AN ORGANIZED HEALTH CARE EDUCATION/TRAINING PROGRAM

## 2023-09-13 PROCEDURE — 93016 CV STRESS TEST SUPVJ ONLY: CPT | Mod: ,,, | Performed by: NURSE PRACTITIONER

## 2023-09-13 PROCEDURE — 78452 HT MUSCLE IMAGE SPECT MULT: CPT | Mod: 26,,, | Performed by: STUDENT IN AN ORGANIZED HEALTH CARE EDUCATION/TRAINING PROGRAM

## 2023-09-13 PROCEDURE — 93306 TTE W/DOPPLER COMPLETE: CPT | Mod: 26,,, | Performed by: STUDENT IN AN ORGANIZED HEALTH CARE EDUCATION/TRAINING PROGRAM

## 2023-09-13 PROCEDURE — 93018 NUCLEAR STRESS TEST (CUPID ONLY): ICD-10-PCS | Mod: ,,, | Performed by: STUDENT IN AN ORGANIZED HEALTH CARE EDUCATION/TRAINING PROGRAM

## 2023-09-13 PROCEDURE — 93016 NUCLEAR STRESS TEST (CUPID ONLY): ICD-10-PCS | Mod: ,,, | Performed by: NURSE PRACTITIONER

## 2023-09-13 PROCEDURE — 63600175 PHARM REV CODE 636 W HCPCS: Performed by: STUDENT IN AN ORGANIZED HEALTH CARE EDUCATION/TRAINING PROGRAM

## 2023-09-13 PROCEDURE — 93018 CV STRESS TEST I&R ONLY: CPT | Mod: ,,, | Performed by: STUDENT IN AN ORGANIZED HEALTH CARE EDUCATION/TRAINING PROGRAM

## 2023-09-13 PROCEDURE — A9500 TC99M SESTAMIBI: HCPCS

## 2023-09-13 RX ORDER — REGADENOSON 0.08 MG/ML
0.4 INJECTION, SOLUTION INTRAVENOUS ONCE
Status: COMPLETED | OUTPATIENT
Start: 2023-09-13 | End: 2023-09-13

## 2023-09-13 RX ADMIN — REGADENOSON 0.4 MG: 0.08 INJECTION, SOLUTION INTRAVENOUS at 09:09

## 2023-09-14 LAB
AORTIC ROOT ANNULUS: 2.54 CM
AORTIC VALVE CUSP SEPERATION: 2.22 CM
AV INDEX (PROSTH): 0.74
AV MEAN GRADIENT: 4 MMHG
AV PEAK GRADIENT: 9 MMHG
AV VALVE AREA BY VELOCITY RATIO: 2.27 CM²
AV VALVE AREA: 2.26 CM²
AV VELOCITY RATIO: 0.75
BSA FOR ECHO PROCEDURE: 2.09 M2
CV ECHO LV RWT: 0.47 CM
CV STRESS BASE HR: 58 BPM
DIASTOLIC BLOOD PRESSURE: 77 MMHG
DOP CALC AO PEAK VEL: 1.46 M/S
DOP CALC AO VTI: 29.8 CM
DOP CALC LVOT AREA: 3 CM2
DOP CALC LVOT DIAMETER: 1.97 CM
DOP CALC LVOT PEAK VEL: 1.09 M/S
DOP CALC LVOT STROKE VOLUME: 67.33 CM3
DOP CALCLVOT PEAK VEL VTI: 22.1 CM
E WAVE DECELERATION TIME: 284.39 MSEC
E/A RATIO: 0.72
E/E' RATIO: 4.74 M/S
ECHO LV POSTERIOR WALL: 1.14 CM (ref 0.6–1.1)
FRACTIONAL SHORTENING: 44 % (ref 28–44)
GLOBAL LONGITUIDAL STRAIN: 16.6 %
INTERVENTRICULAR SEPTUM: 1.05 CM (ref 0.6–1.1)
IVC DIAMETER: 1.7 CM
LEFT ATRIUM SIZE: 3.1 CM
LEFT ATRIUM VOLUME INDEX MOD: 20.2 ML/M2
LEFT ATRIUM VOLUME MOD: 41.63 CM3
LEFT INTERNAL DIMENSION IN SYSTOLE: 2.72 CM (ref 2.1–4)
LEFT VENTRICLE DIASTOLIC VOLUME INDEX: 54.63 ML/M2
LEFT VENTRICLE DIASTOLIC VOLUME: 112.54 ML
LEFT VENTRICLE MASS INDEX: 96 G/M2
LEFT VENTRICLE SYSTOLIC VOLUME INDEX: 13.3 ML/M2
LEFT VENTRICLE SYSTOLIC VOLUME: 27.41 ML
LEFT VENTRICULAR INTERNAL DIMENSION IN DIASTOLE: 4.89 CM (ref 3.5–6)
LEFT VENTRICULAR MASS: 198.59 G
LV LATERAL E/E' RATIO: 4.27 M/S
LV SEPTAL E/E' RATIO: 5.33 M/S
LVOT MG: 2.19 MMHG
LVOT MV: 0.68 CM/S
MV PEAK A VEL: 0.89 M/S
MV PEAK E VEL: 0.64 M/S
MV STENOSIS PRESSURE HALF TIME: 82.47 MS
MV VALVE AREA P 1/2 METHOD: 2.67 CM2
OHS CV CPX 1 MINUTE RECOVERY HEART RATE: 101 BPM
OHS CV CPX 85 PERCENT MAX PREDICTED HEART RATE MALE: 132
OHS CV CPX ESTIMATED METS: 7
OHS CV CPX MAX PREDICTED HEART RATE: 155
OHS CV CPX PATIENT IS FEMALE: 0
OHS CV CPX PATIENT IS MALE: 1
OHS CV CPX PEAK DIASTOLIC BLOOD PRESSURE: 73 MMHG
OHS CV CPX PEAK HEAR RATE: 96 BPM
OHS CV CPX PEAK RATE PRESSURE PRODUCT: NORMAL
OHS CV CPX PEAK SYSTOLIC BLOOD PRESSURE: 172 MMHG
OHS CV CPX PERCENT MAX PREDICTED HEART RATE ACHIEVED: 62
OHS CV CPX RATE PRESSURE PRODUCT PRESENTING: 8062
OHS LV EJECTION FRACTION SIMPSONS BIPLANE MOD: 64 %
PISA MRMAX VEL: 5.59 M/S
PISA TR MAX VEL: 2.66 M/S
PV PEAK GRADIENT: 5 MMHG
PV PEAK VELOCITY: 1.12 M/S
RA PRESSURE ESTIMATED: 3 MMHG
RA VOL SYS: 34.88 ML
RIGHT ATRIAL AREA: 14 CM2
RIGHT VENTRICLE DIASTOLIC LENGTH: 7.4 CM
RIGHT VENTRICLE DIASTOLIC MID DIMENSION: 3.1 CM
RIGHT VENTRICULAR END-DIASTOLIC DIMENSION: 3.7 CM
RIGHT VENTRICULAR LENGTH IN DIASTOLE (APICAL 4-CHAMBER VIEW): 7.36 CM
RV MID DIAMA: 3.06 CM
RV TB RVSP: 6 MMHG
STRESS ECHO POST EXERCISE DUR MIN: 6 MINUTES
STRESS ECHO POST EXERCISE DUR SEC: 15 SECONDS
SYSTOLIC BLOOD PRESSURE: 139 MMHG
TDI LATERAL: 0.15 M/S
TDI SEPTAL: 0.12 M/S
TDI: 0.14 M/S
TR MAX PG: 28 MMHG
TRICUSPID ANNULAR PLANE SYSTOLIC EXCURSION: 2.05 CM
TV REST PULMONARY ARTERY PRESSURE: 31 MMHG
Z-SCORE OF LEFT VENTRICULAR DIMENSION IN END DIASTOLE: -2.41
Z-SCORE OF LEFT VENTRICULAR DIMENSION IN END SYSTOLE: -2.66

## 2023-09-20 DIAGNOSIS — R07.9 CHEST PAIN, UNSPECIFIED TYPE: ICD-10-CM

## 2023-09-20 DIAGNOSIS — R94.39 ABNORMAL STRESS TEST: Primary | ICD-10-CM

## 2023-09-20 NOTE — PROGRESS NOTES
Pt. Wife notified pt. Echo is normal stress test is borderline abnormal will order CCTA f/u after test per Dr. Orozco. Pt.'s wife voiced understanding.

## 2023-10-26 ENCOUNTER — TELEPHONE (OUTPATIENT)
Dept: CARDIOLOGY | Facility: CLINIC | Age: 63
End: 2023-10-26
Payer: MEDICARE

## 2023-10-26 DIAGNOSIS — R07.9 CHEST PAIN, UNSPECIFIED TYPE: ICD-10-CM

## 2023-10-26 DIAGNOSIS — R06.02 SOB (SHORTNESS OF BREATH): Primary | ICD-10-CM

## 2023-10-26 RX ORDER — SODIUM CHLORIDE 0.9 % (FLUSH) 0.9 %
SYRINGE (ML) INJECTION
Status: CANCELLED | OUTPATIENT
Start: 2023-10-26

## 2023-10-26 RX ORDER — METOCLOPRAMIDE HYDROCHLORIDE 5 MG/ML
10 INJECTION INTRAMUSCULAR; INTRAVENOUS ONCE AS NEEDED
Status: CANCELLED | OUTPATIENT
Start: 2023-10-26 | End: 2035-03-24

## 2023-10-26 RX ORDER — DIPHENHYDRAMINE HCL 25 MG
50 CAPSULE ORAL
Status: CANCELLED | OUTPATIENT
Start: 2023-10-26

## 2023-10-26 RX ORDER — ACETAMINOPHEN 325 MG/1
500 TABLET ORAL EVERY 6 HOURS PRN
Status: CANCELLED | OUTPATIENT
Start: 2023-10-26

## 2023-10-26 RX ORDER — ONDANSETRON 2 MG/ML
4 INJECTION INTRAMUSCULAR; INTRAVENOUS ONCE AS NEEDED
Status: CANCELLED | OUTPATIENT
Start: 2023-10-26 | End: 2035-03-24

## 2023-10-26 NOTE — TELEPHONE ENCOUNTER
Pt.'s wife notified pt. CCTA results shows plaque with moderate-severe stenosis will need LHC per. Dr. Orozco. Pt.'s wife voiced understanding. Pt. [facility] 11-9-23

## 2023-10-31 ENCOUNTER — TELEPHONE (OUTPATIENT)
Dept: CARDIOLOGY | Facility: CLINIC | Age: 63
End: 2023-10-31
Payer: MEDICARE

## 2023-10-31 DIAGNOSIS — N28.1 CYST OF LEFT KIDNEY: Primary | ICD-10-CM

## 2023-10-31 NOTE — TELEPHONE ENCOUNTER
Discussed with pt. Wife Dr. Orozco recommends US kidney to f/u on kidney cyst that was indicated on CCTA,pt. Angélica US kidney 11-9-23 @2:00p.m. [facility]. Pt. Wife voiced understanding.

## 2023-11-09 ENCOUNTER — HOSPITAL ENCOUNTER (OUTPATIENT)
Dept: RADIOLOGY | Facility: HOSPITAL | Age: 63
Discharge: HOME | End: 2023-11-09
Attending: STUDENT IN AN ORGANIZED HEALTH CARE EDUCATION/TRAINING PROGRAM
Payer: MEDICARE

## 2023-11-09 DIAGNOSIS — N28.1 CYST OF LEFT KIDNEY: ICD-10-CM

## 2023-11-09 PROCEDURE — 76770 US KIDNEY: ICD-10-PCS | Mod: 26,,, | Performed by: STUDENT IN AN ORGANIZED HEALTH CARE EDUCATION/TRAINING PROGRAM

## 2023-11-09 PROCEDURE — 76770 US EXAM ABDO BACK WALL COMP: CPT | Mod: 26,,, | Performed by: STUDENT IN AN ORGANIZED HEALTH CARE EDUCATION/TRAINING PROGRAM

## 2023-11-09 PROCEDURE — 76770 US EXAM ABDO BACK WALL COMP: CPT | Mod: TC

## 2023-11-16 DIAGNOSIS — N39.0 CHRONIC UTI: Primary | ICD-10-CM

## 2023-12-20 NOTE — PROGRESS NOTES
Pt.'s wife notified renal cyst noted on US f/u with pcp per Dr. Orozco. Pt.'s wife voiced understanding.

## 2024-03-04 ENCOUNTER — OFFICE VISIT (OUTPATIENT)
Dept: CARDIOLOGY | Facility: CLINIC | Age: 64
End: 2024-03-04
Payer: MEDICARE

## 2024-03-04 VITALS
BODY MASS INDEX: 26.77 KG/M2 | HEIGHT: 70 IN | OXYGEN SATURATION: 98 % | HEART RATE: 73 BPM | SYSTOLIC BLOOD PRESSURE: 118 MMHG | DIASTOLIC BLOOD PRESSURE: 80 MMHG | WEIGHT: 187 LBS

## 2024-03-04 DIAGNOSIS — I25.10 CORONARY ARTERY DISEASE INVOLVING NATIVE CORONARY ARTERY OF NATIVE HEART, UNSPECIFIED WHETHER ANGINA PRESENT: Primary | ICD-10-CM

## 2024-03-04 DIAGNOSIS — E78.5 HYPERLIPIDEMIA, UNSPECIFIED HYPERLIPIDEMIA TYPE: ICD-10-CM

## 2024-03-04 DIAGNOSIS — I10 HYPERTENSION, UNSPECIFIED TYPE: ICD-10-CM

## 2024-03-04 PROCEDURE — 99215 OFFICE O/P EST HI 40 MIN: CPT | Mod: PBBFAC | Performed by: NURSE PRACTITIONER

## 2024-03-04 PROCEDURE — 93010 ELECTROCARDIOGRAM REPORT: CPT | Mod: S$PBB,,, | Performed by: STUDENT IN AN ORGANIZED HEALTH CARE EDUCATION/TRAINING PROGRAM

## 2024-03-04 PROCEDURE — 99214 OFFICE O/P EST MOD 30 MIN: CPT | Mod: S$PBB,,, | Performed by: NURSE PRACTITIONER

## 2024-03-04 PROCEDURE — 93005 ELECTROCARDIOGRAM TRACING: CPT | Mod: PBBFAC | Performed by: STUDENT IN AN ORGANIZED HEALTH CARE EDUCATION/TRAINING PROGRAM

## 2024-03-04 RX ORDER — CLOPIDOGREL BISULFATE 75 MG/1
75 TABLET ORAL ONCE
COMMUNITY
Start: 2024-02-03 | End: 2024-03-04 | Stop reason: SDUPTHER

## 2024-03-04 RX ORDER — ISOSORBIDE MONONITRATE 30 MG/1
30 TABLET, EXTENDED RELEASE ORAL DAILY
COMMUNITY
End: 2024-03-04 | Stop reason: SDUPTHER

## 2024-03-04 RX ORDER — ATORVASTATIN CALCIUM 40 MG/1
40 TABLET, FILM COATED ORAL DAILY
Qty: 90 TABLET | Refills: 3 | Status: SHIPPED | OUTPATIENT
Start: 2024-03-04 | End: 2025-03-04

## 2024-03-04 RX ORDER — METOPROLOL SUCCINATE 25 MG/1
25 TABLET, EXTENDED RELEASE ORAL DAILY
COMMUNITY
Start: 2024-01-03 | End: 2024-03-04 | Stop reason: SDUPTHER

## 2024-03-04 RX ORDER — ATORVASTATIN CALCIUM 40 MG/1
1 TABLET, FILM COATED ORAL DAILY
COMMUNITY
Start: 2023-12-28 | End: 2024-03-04 | Stop reason: SDUPTHER

## 2024-03-04 RX ORDER — METOPROLOL TARTRATE 25 MG/1
25 TABLET, FILM COATED ORAL 2 TIMES DAILY
COMMUNITY
Start: 2024-02-02 | End: 2024-03-04 | Stop reason: SDUPTHER

## 2024-03-04 RX ORDER — PANTOPRAZOLE SODIUM 20 MG/1
20 TABLET, DELAYED RELEASE ORAL DAILY
COMMUNITY
Start: 2024-02-03 | End: 2024-05-03

## 2024-03-04 RX ORDER — METOPROLOL TARTRATE 25 MG/1
12.5 TABLET, FILM COATED ORAL 2 TIMES DAILY
Qty: 30 TABLET | Refills: 11 | Status: SHIPPED | OUTPATIENT
Start: 2024-03-04 | End: 2024-05-14 | Stop reason: ALTCHOICE

## 2024-03-04 RX ORDER — CLOPIDOGREL BISULFATE 75 MG/1
75 TABLET ORAL DAILY
Qty: 30 TABLET | Refills: 6 | Status: SHIPPED | OUTPATIENT
Start: 2024-03-04

## 2024-03-04 RX ORDER — TRAZODONE HYDROCHLORIDE 50 MG/1
1 TABLET ORAL NIGHTLY
COMMUNITY

## 2024-03-04 RX ORDER — ISOSORBIDE MONONITRATE 30 MG/1
30 TABLET, EXTENDED RELEASE ORAL DAILY
Qty: 30 TABLET | Refills: 6 | Status: SHIPPED | OUTPATIENT
Start: 2024-03-04 | End: 2024-05-08

## 2024-03-04 NOTE — PROGRESS NOTES
PCP: Vane, Primary Doctor    Referring Provider:     Subjective:   Bradly Coffey is a 64 y.o. male with hx of HTN, HLD, DM2 who presents for follow up after CABG X 3 at [facility] on 1/29/24 with Dr. Vergara.     Pt was seen by Dr. Orozco 08/2023 for evaluation of chest pain along with SOB and fatigue. Echo showed normal EF of 65-70% with no significant WMA and no significant valvular abnormalities. Stress test was abnormal. He was then sent for cardiac CTA which showed plaque with moderate to severe stenosis. LHC revealed severe distal LM lesion involving the trifurcation of the LAD, LCX and RI. He then ultimately underwent CABG as stated above.     Today, pt denies any chest pain/pressure/tightness or SOB. He is accompanied by his wife who states pt has SOB with and without exertion some afternoons. He denies any palpitations, diaphoresis, N/V, edema, orthopnea or PND. He starts cardiac rehab today. He has well healing incisions to his midline chest, to his left inner forearm and to his upper left leg. All are well approximated and free of any s/sx of infection.         Fhx: Non-contributory  Shx: Denies any smoking, etoh or drug use    EKG   Results for orders placed or performed in visit on 08/28/23   EKG 12-lead    Collection Time: 08/28/23  2:53 PM    Narrative    Test Reason : R07.9,    Vent. Rate : 072 BPM     Atrial Rate : 072 BPM     P-R Int : 170 ms          QRS Dur : 094 ms      QT Int : 344 ms       P-R-T Axes : 072 071 005 degrees     QTc Int : 376 ms    Normal sinus rhythm  T wave abnormality, consider inferior ischemia  Abnormal ECG  No previous ECGs available  Confirmed by Ernesto ERNANDEZ, Denis GRACIA ([id number]) on 9/12/2023 1:34:56 PM    Referred By:             Confirmed By:Denis Orozco MD     ECHO Results for orders placed during the hospital encounter of 09/13/23    Echo    Interpretation Summary    Left Ventricle: The left ventricle is normal in size. Normal wall thickness. There is concentric  "remodeling. Normal wall motion. There is normal systolic function with a visually estimated ejection fraction of 65 - 70%. Biplane (2D) method of discs ejection fraction is 64%. There is normal diastolic function.    Right Ventricle: Normal right ventricular cavity size. Wall thickness is normal. Right ventricle wall motion  is normal. Systolic function is normal.    Aortic Valve: The aortic valve is a trileaflet valve.    Mitral Valve: There is mild regurgitation.    Tricuspid Valve: There is mild regurgitation.    Pulmonary Artery: The estimated pulmonary artery systolic pressure is 31 mmHg.    IVC/SVC: Normal venous pressure at 3 mmHg.    LHC No results found for this or any previous visit.        No results found for: "NA", "K", "CL", "CO2", "BUN", "CREATININE", "CALCIUM", "ANIONGAP", "ESTGFRAFRICA", "EGFRNONAA"    No results found for: "CHOL"  No results found for: "HDL"  No results found for: "LDLCALC"  No results found for: "TRIG"  No results found for: "CHOLHDL"    No results found for: "WBC", "HGB", "HCT", "MCV", "PLT"        Current Outpatient Medications:     ascorbic acid, vitamin C, (VITAMIN C) 1000 MG tablet, Take 1,000 mg by mouth once daily., Disp: , Rfl:     aspirin (ECOTRIN) 81 MG EC tablet, Take 81 mg by mouth 2 (two) times a day., Disp: , Rfl:     atorvastatin (LIPITOR) 40 MG tablet, Take 1 tablet by mouth once daily., Disp: , Rfl:     clopidogreL (PLAVIX) 75 mg tablet, Take 75 mg by mouth once., Disp: , Rfl:     coenzyme Q10 100 mg capsule, Take 100 mg by mouth once daily., Disp: , Rfl:     cyanocobalamin, vitamin B-12, 5,000 mcg TbDL, Take 50,000 mcg by mouth Daily., Disp: , Rfl:     isosorbide mononitrate (IMDUR) 30 MG 24 hr tablet, Take 30 mg by mouth once daily., Disp: , Rfl:     meloxicam (MOBIC) 7.5 MG tablet, Take 7.5 mg by mouth., Disp: , Rfl:     metFORMIN (GLUCOPHAGE) 500 MG tablet, Take 500 mg by mouth 2 (two) times daily., Disp: , Rfl:     nitrofurantoin, macrocrystal-monohydrate, " "(MACROBID) 100 MG capsule, Take 50 mg by mouth once daily., Disp: , Rfl:     omega-3 fatty acids/fish oil (FISH OIL-OMEGA-3 FATTY ACIDS) 300-1,000 mg capsule, Take by mouth once daily., Disp: , Rfl:     pantoprazole (PROTONIX) 20 MG tablet, Take 20 mg by mouth once daily., Disp: , Rfl:     sertraline (ZOLOFT) 50 MG tablet, Take 50 mg by mouth., Disp: , Rfl:     tamsulosin (FLOMAX) 0.4 mg Cap, Take 1 capsule by mouth once daily., Disp: , Rfl:     testosterone cypionate (DEPOTESTOTERONE CYPIONATE) 200 mg/mL injection, Inject 1/2 ml intramuscularly once every two weeks, Disp: , Rfl:     traZODone (DESYREL) 50 MG tablet, Take 1 tablet by mouth every evening., Disp: , Rfl:     vitamin D3-folic acid 125 mcg (5,000 unit)-1 mg Tab, Take 125 mcg by mouth Daily., Disp: , Rfl:     zinc gluconate 50 mg tablet, Take 50 mg by mouth once daily., Disp: , Rfl:     metoprolol tartrate (LOPRESSOR) 25 MG tablet, Take 0.5 tablets (12.5 mg total) by mouth 2 (two) times daily., Disp: 30 tablet, Rfl: 11    montelukast (SINGULAIR) 10 mg tablet, Take 10 mg by mouth every evening., Disp: , Rfl:     Review of Systems   Constitutional:  Negative for chills, fever and malaise/fatigue.   Respiratory:  Negative for cough and shortness of breath.    Cardiovascular:  Negative for chest pain, palpitations, orthopnea, leg swelling and PND.   Gastrointestinal:  Negative for abdominal pain, nausea and vomiting.   Musculoskeletal:  Negative for falls.   Neurological:  Negative for focal weakness and weakness.         Objective:   /80 (BP Location: Right arm, Patient Position: Sitting)   Pulse 73   Ht 5' 10" (1.778 m)   Wt 84.8 kg (187 lb)   SpO2 98%   BMI 26.83 kg/m²     Physical Exam  Constitutional:       General: He is not in acute distress.     Appearance: Normal appearance.   Cardiovascular:      Rate and Rhythm: Normal rate and regular rhythm.      Heart sounds: No murmur heard.  Pulmonary:      Effort: Pulmonary effort is normal.      " Breath sounds: Normal breath sounds.   Chest:      Comments: Well healing midline incision, well approximated with no s/sx of infection  Musculoskeletal:      Cervical back: Neck supple. No rigidity.      Right lower leg: No edema.      Left lower leg: No edema.   Skin:     General: Skin is warm and dry.      Comments: Well healing incisions to inner left forearm and upper left leg, free of s/sx of infection.   Neurological:      Mental Status: He is alert and oriented to person, place, and time.   Psychiatric:         Mood and Affect: Mood normal.         Behavior: Behavior normal.           Assessment:     1. Coronary artery disease involving native coronary artery of native heart, unspecified whether angina present  Comprehensive Metabolic Panel    CBC Auto Differential    EKG 12-lead    Magnesium    Lipid Panel      2. Hypertension, unspecified type  Comprehensive Metabolic Panel      3. Hyperlipidemia, unspecified hyperlipidemia type  Lipid Panel            Plan:   Coronary artery disease involving native coronary artery of native heart  - s/p CABG at [facility] on 1/20/24 with Dr. Vergara  - well healing incisions to mildline chest, inner left forearm and upper left leg - all are well approximated and free of s/sx of infection  - cardiac rehab at [facility] starting today  - continue ASA, plavix, high intensity statin along with lisinopril, metoprolol, imdur  - labs including lipids today (or any time this week)  - follow up in one month with Dr. Orozco      Hypertension  - well controlled on current meds    Hyperlipidemia  - lipids today (or any time this week)  - continue high intensity statin

## 2024-03-04 NOTE — ASSESSMENT & PLAN NOTE
- s/p CABG at [facility] on 1/20/24 with Dr. Vergara  - well healing incisions to mildline chest, inner left forearm and upper left leg - all are well approximated and free of s/sx of infection  - cardiac rehab at [facility] starting today  - continue ASA, plavix, high intensity statin along with lisinopril, metoprolol, imdur  - labs including lipids today (or any time this week)  - follow up in one month with Dr. Orozco

## 2024-03-07 ENCOUNTER — HOSPITAL ENCOUNTER (EMERGENCY)
Facility: HOSPITAL | Age: 64
Discharge: HOME | End: 2024-03-07
Payer: MEDICARE

## 2024-03-07 VITALS
RESPIRATION RATE: 18 BRPM | BODY MASS INDEX: 26.92 KG/M2 | DIASTOLIC BLOOD PRESSURE: 76 MMHG | OXYGEN SATURATION: 96 % | HEART RATE: 67 BPM | SYSTOLIC BLOOD PRESSURE: 140 MMHG | WEIGHT: 188 LBS | HEIGHT: 70 IN | TEMPERATURE: 98 F

## 2024-03-07 DIAGNOSIS — L76.82 INCISIONAL PAIN: Primary | ICD-10-CM

## 2024-03-07 DIAGNOSIS — R07.9 CHEST PAIN: ICD-10-CM

## 2024-03-07 LAB
ALBUMIN SERPL BCP-MCNC: 3.4 G/DL (ref 3.5–5)
ALBUMIN/GLOB SERPL: 1.1 {RATIO}
ALP SERPL-CCNC: 82 U/L (ref 45–115)
ALT SERPL W P-5'-P-CCNC: 20 U/L (ref 16–61)
ANION GAP SERPL CALCULATED.3IONS-SCNC: 17 MMOL/L (ref 7–16)
AST SERPL W P-5'-P-CCNC: 19 U/L (ref 15–37)
BASOPHILS # BLD AUTO: 0.01 K/UL (ref 0–0.2)
BASOPHILS NFR BLD AUTO: 0.2 % (ref 0–1)
BILIRUB SERPL-MCNC: 0.3 MG/DL (ref ?–1.2)
BUN SERPL-MCNC: 26 MG/DL (ref 7–18)
BUN/CREAT SERPL: 18 (ref 6–20)
CALCIUM SERPL-MCNC: 9.1 MG/DL (ref 8.5–10.1)
CHLORIDE SERPL-SCNC: 105 MMOL/L (ref 98–107)
CHOLEST SERPL-MCNC: 118 MG/DL (ref 0–200)
CHOLEST/HDLC SERPL: 2.9 {RATIO}
CO2 SERPL-SCNC: 26 MMOL/L (ref 21–32)
CREAT SERPL-MCNC: 1.46 MG/DL (ref 0.7–1.3)
DIFFERENTIAL METHOD BLD: ABNORMAL
EGFR (NO RACE VARIABLE) (RUSH/TITUS): 53 ML/MIN/1.73M2
EOSINOPHIL # BLD AUTO: 0.26 K/UL (ref 0–0.5)
EOSINOPHIL NFR BLD AUTO: 4.8 % (ref 1–4)
ERYTHROCYTE [DISTWIDTH] IN BLOOD BY AUTOMATED COUNT: 14.4 % (ref 11.5–14.5)
GLOBULIN SER-MCNC: 3.2 G/DL (ref 2–4)
GLUCOSE SERPL-MCNC: 111 MG/DL (ref 74–106)
HCT VFR BLD AUTO: 39.5 % (ref 40–54)
HDLC SERPL-MCNC: 41 MG/DL (ref 40–60)
HGB BLD-MCNC: 11.8 G/DL (ref 13.5–18)
LDLC SERPL CALC-MCNC: 52 MG/DL
LDLC/HDLC SERPL: 1.3 {RATIO}
LYMPHOCYTES # BLD AUTO: 1.42 K/UL (ref 1–4.8)
LYMPHOCYTES NFR BLD AUTO: 26.3 % (ref 27–41)
MAGNESIUM SERPL-MCNC: 1.8 MG/DL (ref 1.7–2.3)
MCH RBC QN AUTO: 28.7 PG (ref 27–31)
MCHC RBC AUTO-ENTMCNC: 29.9 G/DL (ref 32–36)
MCV RBC AUTO: 96.1 FL (ref 80–96)
MONOCYTES # BLD AUTO: 0.54 K/UL (ref 0–0.8)
MONOCYTES NFR BLD AUTO: 10 % (ref 2–6)
MPC BLD CALC-MCNC: 10.1 FL (ref 9.4–12.4)
NEUTROPHILS # BLD AUTO: 3.17 K/UL (ref 1.8–7.7)
NEUTROPHILS NFR BLD AUTO: 58.7 % (ref 53–65)
NONHDLC SERPL-MCNC: 77 MG/DL
NT-PROBNP SERPL-MCNC: 526 PG/ML (ref 1–125)
PLATELET # BLD AUTO: 207 K/UL (ref 150–400)
POTASSIUM SERPL-SCNC: 4.7 MMOL/L (ref 3.5–5.1)
PROT SERPL-MCNC: 6.6 G/DL (ref 6.4–8.2)
RBC # BLD AUTO: 4.11 M/UL (ref 4.6–6.2)
SODIUM SERPL-SCNC: 143 MMOL/L (ref 136–145)
TRIGL SERPL-MCNC: 124 MG/DL (ref 35–150)
TROPONIN I SERPL DL<=0.01 NG/ML-MCNC: 48.6 PG/ML
VLDLC SERPL-MCNC: 25 MG/DL
WBC # BLD AUTO: 5.4 K/UL (ref 4.5–11)

## 2024-03-07 PROCEDURE — 80053 COMPREHEN METABOLIC PANEL: CPT | Performed by: REGISTERED NURSE

## 2024-03-07 PROCEDURE — 99285 EMERGENCY DEPT VISIT HI MDM: CPT | Mod: 25

## 2024-03-07 PROCEDURE — 83735 ASSAY OF MAGNESIUM: CPT | Performed by: REGISTERED NURSE

## 2024-03-07 PROCEDURE — 93010 ELECTROCARDIOGRAM REPORT: CPT | Mod: ,,, | Performed by: HOSPITALIST

## 2024-03-07 PROCEDURE — 83880 ASSAY OF NATRIURETIC PEPTIDE: CPT | Performed by: REGISTERED NURSE

## 2024-03-07 PROCEDURE — 93005 ELECTROCARDIOGRAM TRACING: CPT

## 2024-03-07 PROCEDURE — 85025 COMPLETE CBC W/AUTO DIFF WBC: CPT | Performed by: REGISTERED NURSE

## 2024-03-07 PROCEDURE — 99284 EMERGENCY DEPT VISIT MOD MDM: CPT | Mod: GF | Performed by: REGISTERED NURSE

## 2024-03-07 PROCEDURE — 80061 LIPID PANEL: CPT | Performed by: REGISTERED NURSE

## 2024-03-07 PROCEDURE — 84484 ASSAY OF TROPONIN QUANT: CPT | Performed by: REGISTERED NURSE

## 2024-03-08 NOTE — ED PROVIDER NOTES
Encounter Date: 3/7/2024       History     Chief Complaint   Patient presents with    Incisional Pain     64 y-old  male with PMH of HTN, HLD, DM2 who recently underwent CABG X 3 at [facility] on 1/29/24 with Dr. Vergara. He is followed by Dr. Haynes. Presents to [facility] ED with complaint of pain to the chest tube site to his left epigastric area that is described as a sharp/intermittent pain that was rated as a 4 on a 0-10 point scale starting around 1100 today. States that his last pain was >30-45 min PTA. He denies any palpitations, diaphoresis, N/V, edema, orthopnea or PND. States that he has had some fatigue since surgery, but that this has been improving. He has well healing incisions to his midline chest, to his left inner forearm and to his upper left leg. All are well approximated and free of any s/sx of infection. Does have a scab to the area of pain that is approximately 1/2 cm in diameter.       Review of patient's allergies indicates:   Allergen Reactions    Levofloxacin in d5w Shortness Of Breath     Past Medical History:   Diagnosis Date    Arthritis     Hypertension      Past Surgical History:   Procedure Laterality Date    CORONARY ARTERY BYPASS GRAFT (CABG)  01/29/2024    RENAL BIOPSY       History reviewed. No pertinent family history.  Social History     Tobacco Use    Smoking status: Never    Smokeless tobacco: Current     Types: Chew   Substance Use Topics    Alcohol use: Yes     Comment: occasionally    Drug use: Never     Review of Systems   Constitutional: Negative.    HENT: Negative.     Eyes: Negative.    Respiratory: Negative.     Cardiovascular:  Positive for chest pain (This is epigastric).   Gastrointestinal: Negative.    Endocrine: Negative.    Genitourinary: Negative.    Musculoskeletal: Negative.    Skin:  Positive for wound (Healing sterniotomy wound and chest tube sites).   Allergic/Immunologic: Negative.    Neurological: Negative.    Hematological: Negative.     Psychiatric/Behavioral: Negative.         Physical Exam     Initial Vitals [03/07/24 1918]   BP Pulse Resp Temp SpO2   (!) 151/84 69 18 98.2 °F (36.8 °C) 96 %      MAP       --         Physical Exam    Nursing note and vitals reviewed.  Constitutional: He appears well-developed and well-nourished.   HENT:   Head: Normocephalic and atraumatic.   Right Ear: External ear normal.   Left Ear: External ear normal.   Nose: Nose normal.   Mouth/Throat: Oropharynx is clear and moist.   Eyes: Conjunctivae are normal.   Neck: Neck supple.   Normal range of motion.  Cardiovascular:  Normal rate, regular rhythm, normal heart sounds and intact distal pulses.     Exam reveals no gallop and no friction rub.       No murmur heard.  Pulmonary/Chest: Breath sounds normal. No respiratory distress. He has no wheezes. He has no rhonchi. He has no rales. He exhibits no tenderness.   Abdominal: Abdomen is soft. Bowel sounds are normal. He exhibits no distension and no mass. There is no abdominal tenderness. There is no rebound and no guarding.   Musculoskeletal:         General: Normal range of motion.      Cervical back: Normal range of motion and neck supple.     Neurological: He is alert and oriented to person, place, and time. He has normal strength. GCS score is 15. GCS eye subscore is 4. GCS verbal subscore is 5. GCS motor subscore is 6.   Skin: Skin is warm and dry. Capillary refill takes less than 2 seconds.   Psychiatric: He has a normal mood and affect. His behavior is normal. Judgment and thought content normal.         Medical Screening Exam   See Full Note    ED Course   Procedures  Labs Reviewed   COMPREHENSIVE METABOLIC PANEL - Abnormal; Notable for the following components:       Result Value    Anion Gap 17 (*)     Glucose 111 (*)     BUN 26 (*)     Creatinine 1.46 (*)     Albumin 3.4 (*)     eGFR 53 (*)     All other components within normal limits   NT-PRO NATRIURETIC PEPTIDE - Abnormal; Notable for the following  components:    ProBNP 526 (*)     All other components within normal limits   CBC WITH DIFFERENTIAL - Abnormal; Notable for the following components:    RBC 4.11 (*)     Hemoglobin 11.8 (*)     Hematocrit 39.5 (*)     MCV 96.1 (*)     MCHC 29.9 (*)     Lymphocytes % 26.3 (*)     Monocytes % 10.0 (*)     Eosinophils % 4.8 (*)     All other components within normal limits   TROPONIN I - Normal   MAGNESIUM - Normal   CBC W/ AUTO DIFFERENTIAL    Narrative:     The following orders were created for panel order CBC auto differential.  Procedure                               Abnormality         Status                     ---------                               -----------         ------                     CBC with Differential[[id number]]       Abnormal            Final result                 Please view results for these tests on the individual orders.   LIPID PANEL        ECG Results              EKG 12-lead (In process)        Collection Time Result Time QRS Duration OHS QTC Calculation    03/07/24 19:25:35 03/07/24 19:29:09 94 420                     In process by Interface, Lab In [facility] (03/07/24 19:29:18)                   Narrative:    Test Reason : R07.9,    Vent. Rate : 065 BPM     Atrial Rate : 065 BPM     P-R Int : 160 ms          QRS Dur : 094 ms      QT Int : 404 ms       P-R-T Axes : 024 085 153 degrees     QTc Int : 420 ms    Normal sinus rhythm  T wave abnormality, consider inferior ischemia  T wave abnormality, consider anterolateral ischemia  Abnormal ECG  When compared with ECG of 04-MAR-2024 12:35,  No significant change was found    Referred By: [email or web address]   SELF           Confirmed By:                                   Imaging Results              X-Ray Chest AP Portable (Final result)  Result time 03/07/24 19:52:46      Final result by Blas Patterson MD (03/07/24 19:52:46)                   Narrative:    EXAMINATION:  XR CHEST AP PORTABLE    CLINICAL HISTORY:  Chest pain,  "unspecified    TECHNIQUE:  Single frontal view of the chest was performed.    COMPARISON:  06/25/2019    FINDINGS:  Sternotomy.  Cardiac silhouette enlarged.  Lungs clear.  No pneumothorax or large pleural effusion.      Electronically signed by: Blas Patterson  Date:    03/07/2024  Time:    19:52                                     Medications - No data to display  Medical Decision Making  64 y-old  male with PMH of HTN, HLD, DM2 who recently underwent CABG X 3 at [facility] on 1/29/24 with Dr. Vergara. He is followed by Dr. Haynes. Presents to [facility] ED with complaint of pain to the chest tube site to his left epigastric area that is described as a sharp/intermittent pain that was rated as a 4 on a 0-10 point scale starting around 1100 today and has been on et off "all day." States that his last pain was >30-45 min PTA. He denies any palpitations, diaphoresis, N/V, edema, orthopnea or PND. States that he has had some fatigue since surgery, but that this has been improving. He has well healing incisions to his midline chest, to his left inner forearm and to his upper left leg. All are well approximated and free of any s/sx of infection. Does have a scab to the area of pain that is approximately 1/2 cm in diameter.       Problems Addressed:  Chest pain: undiagnosed new problem with uncertain prognosis     Details: Patients "chest pain" is more epigastric in location and is to the site of his old chest tube where he has a 1/2 cm scab. No s/s of infection. No chest pain while in the ED.     Amount and/or Complexity of Data Reviewed  External Data Reviewed: notes.     Details: Echo report, office visit,   Labs: ordered.     Details: 5.4 WBC, BUN/Creat 26/1.46, , Trop 48.6  Radiology: ordered.     Details: Sternotomy.  Cardiac silhouette enlarged.  Lungs clear.  No pneumothorax or large pleural effusion.    Risk  Risk Details: Patient has not had any pain since arriving to the ED. WBC 5.4, BUN/Creat " 26/1.46, Troponin 48.6 (no old labs to compare).  without any dyspnea, orthopnea, SMITH, JVD, edema, or pulmonary congestion noted on x-ray. I discussed all work up with patient and his wife who is at bedside. Recommend that they call Dr. Orozco's office in the AM for f/u or return for any new or worsening problems or otherwise as needed. Both  and wife verbalize understanding and agree with plan.                                       Clinical Impression:   Final diagnoses:  [R07.9] Chest pain  [L76.82] Incisional pain (Primary)        ED Disposition Condition    Discharge Stable          ED Prescriptions    None       Follow-up Information       Follow up With Specialties Details Why Contact Info    Denis Orozco MD Interventional Cardiology, Cardiology   [address] [place] [id number]  [phone]               Brice Villegas, NP-BEN  03/07/24 1029

## 2024-03-21 ENCOUNTER — OFFICE VISIT (OUTPATIENT)
Dept: DERMATOLOGY | Facility: CLINIC | Age: 64
End: 2024-03-21
Payer: MEDICARE

## 2024-03-21 DIAGNOSIS — L57.0 ACTINIC KERATOSES: Primary | ICD-10-CM

## 2024-03-21 DIAGNOSIS — L72.0 EPIDERMAL CYST: ICD-10-CM

## 2024-03-21 DIAGNOSIS — Z85.828 HISTORY OF NONMELANOMA SKIN CANCER: ICD-10-CM

## 2024-03-21 DIAGNOSIS — D22.9 MULTIPLE BENIGN NEVI: ICD-10-CM

## 2024-03-21 PROCEDURE — 17000 DESTRUCT PREMALG LESION: CPT | Mod: ,,, | Performed by: STUDENT IN AN ORGANIZED HEALTH CARE EDUCATION/TRAINING PROGRAM

## 2024-03-21 PROCEDURE — 99203 OFFICE O/P NEW LOW 30 MIN: CPT | Mod: 25,,, | Performed by: STUDENT IN AN ORGANIZED HEALTH CARE EDUCATION/TRAINING PROGRAM

## 2024-03-21 PROCEDURE — 17003 DESTRUCT PREMALG LES 2-14: CPT | Mod: ,,, | Performed by: STUDENT IN AN ORGANIZED HEALTH CARE EDUCATION/TRAINING PROGRAM

## 2024-03-21 NOTE — PROGRESS NOTES
[facility]  Selvin Amador MD    [address] [place] [address]  [phone]    Fax: [phone]    Patient Name: Bradly Coffey  Medical Record Number: [id number]  PCP: Vane, Primary Doctor  Age: 64 y.o. : 1960  Contact: [phone] (home)     CC: lesion on R arm  History of Present Illness:     Bradly Coffey is a 64 y.o.  male with history of skin cancer ( patient unsure what type of skin cancer on L hand about 6 years ago) hand  who presents to clinic today for lesion on R arm. It has been present 1 year. Symptoms include growth.     The patient has no other concerns today.    Review of Systems:     Unremarkable other than mentioned above.     Physical Exam:     General: Relaxed, oriented, alert    Skin examination of the scalp, face, neck, chest, back, abdomen, upper extremities and lower extremities were normal except for as listed below      Assessment and Plan:     1. History of NMSC   Well-healed scar on L hand   No e/o recurrence   Recommend sunscreen and good photoprotection       2. Benign Nevus   - Dome shaped regular papule    Plan: Reassurance.    Counseling.  Monthly self-skin checks to monitor for any changes in moles are recommended.  Contact Office if: Any moles change in size, shape or color; itch, burn or bleed.  Discussed use of sunscreen SPF 30 or great       3. Epidermal Cyst  - subcutaneous cyst with prominent follicular pore located on the mid back    Plan:   Epidermal Cysts can be excised if necessary.    4. Actinic Keratoses  Erythematous, scaly papules on L hand, R hand    Plan: Liquid Nitrogen.  A total of 3 lesions were treated with liquid nitrogen for 2 freeze-thaw cycles lasting 5 seconds, located on the above locations.   The patient's consent was obtained including but not limited to risks of crusting, scabbing,  blistering, scarring, darker or lighter pigmentary change, recurrence, incomplete removal and  infection.    Counseling.  Sun protective clothing and broad spectrum sunscreen can prevent the formation of AK.   AKs can be treated with cryotherapy, photodynamic therapy, imiquimod, topical 5-FU.  Actinic Keratoses are precancerous proliferations that occur within sun damaged skin. If untreated,  a small subset of AKs can develop into Squamous Cell Carcinoma.    Return to clinic in 1 year.    AVS printed with patient instructions     Selvin Amador MD   Mohs Surgery/Dermatologic Oncology  Dermatology

## 2024-03-25 LAB
OHS QRS DURATION: 92 MS
OHS QTC CALCULATION: 416 MS

## 2024-05-08 ENCOUNTER — OFFICE VISIT (OUTPATIENT)
Dept: CARDIOLOGY | Facility: CLINIC | Age: 64
End: 2024-05-08
Payer: MEDICARE

## 2024-05-08 VITALS
RESPIRATION RATE: 18 BRPM | DIASTOLIC BLOOD PRESSURE: 70 MMHG | SYSTOLIC BLOOD PRESSURE: 120 MMHG | WEIGHT: 199 LBS | HEIGHT: 70 IN | HEART RATE: 65 BPM | BODY MASS INDEX: 28.49 KG/M2

## 2024-05-08 DIAGNOSIS — I25.10 CORONARY ARTERY DISEASE INVOLVING NATIVE CORONARY ARTERY OF NATIVE HEART, UNSPECIFIED WHETHER ANGINA PRESENT: Primary | ICD-10-CM

## 2024-05-08 DIAGNOSIS — I10 HYPERTENSION, UNSPECIFIED TYPE: ICD-10-CM

## 2024-05-08 DIAGNOSIS — E78.5 HYPERLIPIDEMIA, UNSPECIFIED HYPERLIPIDEMIA TYPE: ICD-10-CM

## 2024-05-08 DIAGNOSIS — I10 HYPERTENSION, UNSPECIFIED TYPE: Primary | ICD-10-CM

## 2024-05-08 PROCEDURE — 99214 OFFICE O/P EST MOD 30 MIN: CPT | Mod: S$PBB,,, | Performed by: STUDENT IN AN ORGANIZED HEALTH CARE EDUCATION/TRAINING PROGRAM

## 2024-05-08 PROCEDURE — 93005 ELECTROCARDIOGRAM TRACING: CPT | Mod: PBBFAC | Performed by: STUDENT IN AN ORGANIZED HEALTH CARE EDUCATION/TRAINING PROGRAM

## 2024-05-08 PROCEDURE — 99214 OFFICE O/P EST MOD 30 MIN: CPT | Mod: PBBFAC,25 | Performed by: STUDENT IN AN ORGANIZED HEALTH CARE EDUCATION/TRAINING PROGRAM

## 2024-05-08 PROCEDURE — 93010 ELECTROCARDIOGRAM REPORT: CPT | Mod: S$PBB,,, | Performed by: STUDENT IN AN ORGANIZED HEALTH CARE EDUCATION/TRAINING PROGRAM

## 2024-05-08 RX ORDER — AMLODIPINE BESYLATE 5 MG/1
5 TABLET ORAL DAILY
COMMUNITY

## 2024-05-08 NOTE — ASSESSMENT & PLAN NOTE
- s/p CABG at [facility] on 1/20/24 with Dr. Vergara  -  Stop aspirin; continue Plavix (acid reflux)  high intensity statin along with lisinopril, metoprolol, imdur

## 2024-05-08 NOTE — PATIENT INSTRUCTIONS
Take mobic as needed for break through pain on tylenol   Stop aspirin; continue Plavix   Take protonix 45-60 mins before food  Follow up with Cinda in 6 months

## 2024-05-08 NOTE — PROGRESS NOTES
PCP: No, Primary Doctor    Referring Provider:     Subjective:   Bradly Coffey is a 64 y.o. male with hx of HTN, HLD, DM2 who presents for follow up after CABG X 3 at [facility] on 1/29/24 with Dr. Vergara.     5/8/24 - Doing well. No CP. Finished with cardiac rehab. Reports acid reflux symptoms. Taking mobic everyday.     3/2024 - Pt was seen by Dr. Orozco 08/2023 for evaluation of chest pain along with SOB and fatigue. Echo showed normal EF of 65-70% with no significant WMA and no significant valvular abnormalities. Stress test was abnormal. He was then sent for cardiac CTA which showed plaque with moderate to severe stenosis. LHC revealed severe distal LM lesion involving the trifurcation of the LAD, LCX and RI. He then ultimately underwent CABG as stated above.     Today, pt denies any chest pain/pressure/tightness or SOB. He is accompanied by his wife who states pt has SOB with and without exertion some afternoons. He denies any palpitations, diaphoresis, N/V, edema, orthopnea or PND. He starts cardiac rehab today. He has well healing incisions to his midline chest, to his left inner forearm and to his upper left leg. All are well approximated and free of any s/sx of infection.         Fhx: Non-contributory  Shx: Denies any smoking, etoh or drug use    EKG   Results for orders placed or performed during the hospital encounter of 03/07/24   EKG 12-lead    Collection Time: 03/07/24  7:25 PM   Result Value Ref Range    QRS Duration 94 ms    [facility] QTC Calculation 420 ms    Narrative    Test Reason : R07.9,    Vent. Rate : 065 BPM     Atrial Rate : 065 BPM     P-R Int : 160 ms          QRS Dur : 094 ms      QT Int : 404 ms       P-R-T Axes : 024 085 153 degrees     QTc Int : 420 ms    Normal sinus rhythm  T wave abnormality, consider inferior ischemia  T wave abnormality, consider anterolateral ischemia  Abnormal ECG  When compared with ECG of 04-MAR-2024 12:35,  No significant change was found    Referred By:  AAAREFERR   SELF           Confirmed By:      ECHO Results for orders placed during the hospital encounter of 09/13/23    Echo    Interpretation Summary    Left Ventricle: The left ventricle is normal in size. Normal wall thickness. There is concentric remodeling. Normal wall motion. There is normal systolic function with a visually estimated ejection fraction of 65 - 70%. Biplane (2D) method of discs ejection fraction is 64%. There is normal diastolic function.    Right Ventricle: Normal right ventricular cavity size. Wall thickness is normal. Right ventricle wall motion  is normal. Systolic function is normal.    Aortic Valve: The aortic valve is a trileaflet valve.    Mitral Valve: There is mild regurgitation.    Tricuspid Valve: There is mild regurgitation.    Pulmonary Artery: The estimated pulmonary artery systolic pressure is 31 mmHg.    IVC/SVC: Normal venous pressure at 3 mmHg.    [facility] No results found for this or any previous visit.        Lab Results   Component Value Date     03/07/2024    K 4.7 03/07/2024     03/07/2024    CO2 26 03/07/2024    BUN 26 (H) 03/07/2024    CREATININE 1.46 (H) 03/07/2024    CALCIUM 9.1 03/07/2024    ANIONGAP 17 (H) 03/07/2024       Lab Results   Component Value Date    CHOL 118 03/07/2024     Lab Results   Component Value Date    HDL 41 03/07/2024     Lab Results   Component Value Date    LDLCALC 52 03/07/2024     Lab Results   Component Value Date    TRIG 124 03/07/2024     Lab Results   Component Value Date    CHOLHDL 2.9 03/07/2024       Lab Results   Component Value Date    WBC 5.40 03/07/2024    HGB 11.8 (L) 03/07/2024    HCT 39.5 (L) 03/07/2024    MCV 96.1 (H) 03/07/2024     03/07/2024           Current Outpatient Medications:     amLODIPine (NORVASC) 5 MG tablet, Take 5 mg by mouth once daily., Disp: , Rfl:     ascorbic acid, vitamin C, (VITAMIN C) 1000 MG tablet, Take 1,000 mg by mouth once daily., Disp: , Rfl:     atorvastatin (LIPITOR) 40 MG  tablet, Take 1 tablet (40 mg total) by mouth once daily., Disp: 90 tablet, Rfl: 3    clopidogreL (PLAVIX) 75 mg tablet, Take 1 tablet (75 mg total) by mouth once daily., Disp: 30 tablet, Rfl: 6    coenzyme Q10 100 mg capsule, Take 100 mg by mouth once daily., Disp: , Rfl:     cyanocobalamin, vitamin B-12, 5,000 mcg TbDL, Take 50,000 mcg by mouth Daily., Disp: , Rfl:     meloxicam (MOBIC) 7.5 MG tablet, Take 7.5 mg by mouth., Disp: , Rfl:     metFORMIN (GLUCOPHAGE) 500 MG tablet, Take 500 mg by mouth 2 (two) times daily., Disp: , Rfl:     metoprolol tartrate (LOPRESSOR) 25 MG tablet, Take 0.5 tablets (12.5 mg total) by mouth 2 (two) times daily., Disp: 30 tablet, Rfl: 11    nitrofurantoin, macrocrystal-monohydrate, (MACROBID) 100 MG capsule, Take 50 mg by mouth once daily., Disp: , Rfl:     omega-3 fatty acids/fish oil (FISH OIL-OMEGA-3 FATTY ACIDS) 300-1,000 mg capsule, Take by mouth once daily., Disp: , Rfl:     sertraline (ZOLOFT) 50 MG tablet, Take 50 mg by mouth., Disp: , Rfl:     tamsulosin (FLOMAX) 0.4 mg Cap, Take 1 capsule by mouth once daily., Disp: , Rfl:     testosterone cypionate (DEPOTESTOTERONE CYPIONATE) 200 mg/mL injection, Inject 1/2 ml intramuscularly once every two weeks, Disp: , Rfl:     traZODone (DESYREL) 50 MG tablet, Take 1 tablet by mouth every evening., Disp: , Rfl:     vitamin D3-folic acid 125 mcg (5,000 unit)-1 mg Tab, Take 125 mcg by mouth Daily., Disp: , Rfl:     zinc gluconate 50 mg tablet, Take 50 mg by mouth once daily., Disp: , Rfl:     pantoprazole (PROTONIX) 20 MG tablet, Take 20 mg by mouth once daily., Disp: , Rfl:     Review of Systems   Constitutional:  Negative for chills, fever and malaise/fatigue.   Respiratory:  Negative for cough and shortness of breath.    Cardiovascular:  Negative for chest pain, palpitations, orthopnea, leg swelling and PND.   Gastrointestinal:  Negative for abdominal pain, nausea and vomiting.   Musculoskeletal:  Negative for falls.   Neurological:   "Negative for focal weakness and weakness.         Objective:   /70   Pulse 65   Resp 18   Ht 5' 10" (1.778 m)   Wt 90.3 kg (199 lb)   BMI 28.55 kg/m²     Physical Exam  Constitutional:       General: He is not in acute distress.     Appearance: Normal appearance.   Cardiovascular:      Rate and Rhythm: Normal rate and regular rhythm.      Heart sounds: No murmur heard.  Pulmonary:      Effort: Pulmonary effort is normal.      Breath sounds: Normal breath sounds.   Chest:      Comments: Well healing midline incision, well approximated with no s/sx of infection  Musculoskeletal:      Cervical back: Neck supple. No rigidity.      Right lower leg: No edema.      Left lower leg: No edema.   Skin:     General: Skin is warm and dry.      Comments: Well healing incisions to inner left forearm and upper left leg, free of s/sx of infection.   Neurological:      Mental Status: He is alert and oriented to person, place, and time.   Psychiatric:         Mood and Affect: Mood normal.         Behavior: Behavior normal.           Assessment:     1. Coronary artery disease involving native coronary artery of native heart, unspecified whether angina present        2. Hypertension, unspecified type  EKG 12-lead      3. Hyperlipidemia, unspecified hyperlipidemia type              Plan:   Coronary artery disease involving native coronary artery of native heart  - s/p CABG at [facility] on 1/20/24 with Dr. Vergara  -  Stop aspirin; continue Plavix (acid reflux)  high intensity statin along with lisinopril, metoprolol, imdur      Hyperlipidemia  Continue lipitor 40%  LDL goal < 70    Hypertension  - well controlled on current meds            "

## 2024-05-13 RX ORDER — CARVEDILOL 6.25 MG/1
6.25 TABLET ORAL 2 TIMES DAILY WITH MEALS
COMMUNITY
End: 2024-05-13 | Stop reason: SDUPTHER

## 2024-05-14 ENCOUNTER — TELEPHONE (OUTPATIENT)
Dept: CARDIOLOGY | Facility: CLINIC | Age: 64
End: 2024-05-14
Payer: MEDICARE

## 2024-05-14 RX ORDER — CARVEDILOL 6.25 MG/1
6.25 TABLET ORAL 2 TIMES DAILY WITH MEALS
Qty: 180 TABLET | Refills: 1 | Status: SHIPPED | OUTPATIENT
Start: 2024-05-14

## 2024-05-14 NOTE — TELEPHONE ENCOUNTER
Pt.'s wife called stated pt.'s \92, 165\94, 173\93 request further instructions. D/W pt.'s wife stop metoprolol will start carvedilol 6.25mg bid call in few days to give update per Dr. Orozco. Pt.'s wife voiced understanding.

## 2024-05-17 LAB
OHS QRS DURATION: 106 MS
OHS QTC CALCULATION: 436 MS

## 2024-05-21 LAB
OHS QRS DURATION: 94 MS
OHS QTC CALCULATION: 420 MS

## 2024-05-24 ENCOUNTER — TELEPHONE (OUTPATIENT)
Dept: CARDIOLOGY | Facility: CLINIC | Age: 64
End: 2024-05-24
Payer: MEDICARE

## 2024-05-24 NOTE — TELEPHONE ENCOUNTER
Pt. Wife called pt. \90, 155\85 request further instructions. Advised pt. Wife pt. To increase coreg to 12.5mg bid ( pt. To double 6,25 mg bid until close to being out will then send in 12.5mg bid) call back to give update in few weeks per Dr. Orozco. Pt.'s wife voiced understanding.

## 2024-09-17 RX ORDER — CLOPIDOGREL BISULFATE 75 MG/1
75 TABLET ORAL
Qty: 30 TABLET | Refills: 6 | Status: SHIPPED | OUTPATIENT
Start: 2024-09-17

## 2024-09-19 RX ORDER — CARVEDILOL 6.25 MG/1
6.25 TABLET ORAL 2 TIMES DAILY WITH MEALS
Qty: 180 TABLET | Refills: 1 | Status: SHIPPED | OUTPATIENT
Start: 2024-09-19

## 2024-11-13 ENCOUNTER — OFFICE VISIT (OUTPATIENT)
Dept: CARDIOLOGY | Facility: CLINIC | Age: 64
End: 2024-11-13
Payer: MEDICARE

## 2024-11-13 VITALS
OXYGEN SATURATION: 96 % | BODY MASS INDEX: 26.92 KG/M2 | DIASTOLIC BLOOD PRESSURE: 78 MMHG | HEIGHT: 70 IN | SYSTOLIC BLOOD PRESSURE: 136 MMHG | WEIGHT: 188 LBS | HEART RATE: 62 BPM

## 2024-11-13 DIAGNOSIS — R07.9 CHEST PAIN, UNSPECIFIED TYPE: ICD-10-CM

## 2024-11-13 DIAGNOSIS — I25.10 CORONARY ARTERY DISEASE INVOLVING NATIVE CORONARY ARTERY OF NATIVE HEART, UNSPECIFIED WHETHER ANGINA PRESENT: Primary | ICD-10-CM

## 2024-11-13 DIAGNOSIS — I15.9 SECONDARY HYPERTENSION: Primary | ICD-10-CM

## 2024-11-13 DIAGNOSIS — E78.5 HYPERLIPIDEMIA, UNSPECIFIED HYPERLIPIDEMIA TYPE: ICD-10-CM

## 2024-11-13 DIAGNOSIS — E55.9 VITAMIN D DEFICIENCY, UNSPECIFIED: ICD-10-CM

## 2024-11-13 DIAGNOSIS — I20.89 ANGINAL EQUIVALENT: ICD-10-CM

## 2024-11-13 DIAGNOSIS — I15.9 SECONDARY HYPERTENSION: ICD-10-CM

## 2024-11-13 DIAGNOSIS — Z79.899 OTHER LONG TERM (CURRENT) DRUG THERAPY: ICD-10-CM

## 2024-11-13 DIAGNOSIS — R60.0 BILATERAL EDEMA OF LOWER EXTREMITY: ICD-10-CM

## 2024-11-13 DIAGNOSIS — R53.83 FATIGUE, UNSPECIFIED TYPE: ICD-10-CM

## 2024-11-13 PROBLEM — R53.1 WEAKNESS: Status: ACTIVE | Noted: 2024-11-13

## 2024-11-13 PROCEDURE — 93010 ELECTROCARDIOGRAM REPORT: CPT | Mod: S$PBB,,, | Performed by: STUDENT IN AN ORGANIZED HEALTH CARE EDUCATION/TRAINING PROGRAM

## 2024-11-13 PROCEDURE — 99999 PR PBB SHADOW E&M-EST. PATIENT-LVL V: CPT | Mod: PBBFAC,,, | Performed by: NURSE PRACTITIONER

## 2024-11-13 PROCEDURE — 99215 OFFICE O/P EST HI 40 MIN: CPT | Mod: PBBFAC | Performed by: NURSE PRACTITIONER

## 2024-11-13 PROCEDURE — 93005 ELECTROCARDIOGRAM TRACING: CPT | Mod: PBBFAC | Performed by: STUDENT IN AN ORGANIZED HEALTH CARE EDUCATION/TRAINING PROGRAM

## 2024-11-13 RX ORDER — RANOLAZINE 500 MG/1
500 TABLET, EXTENDED RELEASE ORAL 2 TIMES DAILY
Qty: 180 TABLET | Refills: 1 | Status: SHIPPED | OUTPATIENT
Start: 2024-11-13 | End: 2025-11-13

## 2024-11-13 NOTE — ASSESSMENT & PLAN NOTE
Has complaint of intermittent anginal equivalent  Denies chest pain on exam today  EKG is unremarkable  Lexiscan ordered  Start Ranexa 500 mg b.i.d. (pt stated Imdur caused HA)

## 2024-11-13 NOTE — ASSESSMENT & PLAN NOTE
- s/p CABG at [facility] on 1/20/24 with Dr. Vergara  - complaint of intermittent sharp left-sided chest pain which is his anginal equivalent - denies chest pain on exam today  - EKG is unremarkable  - Lexiscan ordered  - start Ranexa 500 mg b.i.d. (stated Imdur gave him a headache and stopped taking it)  - aspirin discontinued  - continue Plavix (acid reflux), high intensity statin along with Coreg 6.25 mg b.i.d.  - also on amlodipine 5 mg daily

## 2024-11-13 NOTE — PROGRESS NOTES
PCP: Vane, Primary Doctor    Referring Provider:     Subjective:   Bradly Coffey is a 64 y.o. male with hx of   CAD s/p  CABG x3 at [facility] 01/2024, HTN, HLD, DM2 who presents for follow up.    11/13/24 - patient has complaint of intermittent sharp pain to his left chest which he states is the same pain he had before his CABG.  He denies chest pain on exam today.  Also has complaint of increased shortness of breath with exertion and BLE edema.  He denies orthopnea or PND.  He states he has no energy and feels fatigued all the time.  He states his legs feel weak when he walks but denies pain with walking.  He had a sleep study that showed mild sleep apnea.  He is going to  his CPAP machine today.  On exam, he has 1+ edema to his BLE.  EKG shows sinus bradycardia with nonspecific T-wave abnormality.    5/8/24 (Seen by Dr. Orozco)- Doing well. No CP. Finished with cardiac rehab. Reports acid reflux symptoms. Taking mobic everyday.     3/2024 - Pt was seen by Dr. Orozco 08/2023 for evaluation of chest pain along with SOB and fatigue. Echo showed normal EF of 65-70% with no significant WMA and no significant valvular abnormalities. Stress test was abnormal. He was then sent for cardiac CTA which showed plaque with moderate to severe stenosis. LHC revealed severe distal LM lesion involving the trifurcation of the LAD, LCX and RI. He then ultimately underwent CABG as stated above.     Today, pt denies any chest pain/pressure/tightness or SOB. He is accompanied by his wife who states pt has SOB with and without exertion some afternoons. He denies any palpitations, diaphoresis, N/V, edema, orthopnea or PND. He starts cardiac rehab today. He has well healing incisions to his midline chest, to his left inner forearm and to his upper left leg. All are well approximated and free of any s/sx of infection.         Fhx: Non-contributory  Shx: Denies any smoking, etoh or drug use    EKG   Results for orders placed or  performed in visit on 05/08/24   EKG 12-lead    Collection Time: 05/08/24 11:16 AM   Result Value Ref Range    QRS Duration 106 ms    [facility]HS QTC Calculation 436 ms    Narrative    Test Reason : I10,    Vent. Rate : 065 BPM     Atrial Rate : 065 BPM     P-R Int : 170 ms          QRS Dur : 106 ms      QT Int : 420 ms       P-R-T Axes : 082 090 154 degrees     QTc Int : 436 ms    Normal sinus rhythm  Rightward axis  T wave abnormality, consider lateral ischemia  Abnormal ECG  When compared with ECG of 07-MAR-2024 19:25,  No significant change was found  Confirmed by Ernesto ERNANDEZ, Denis GRACIA ([id number]) on 5/17/2024 11:36:58 AM    Referred By:             Confirmed By:Denis Orozco MD     ECHO Results for orders placed during the hospital encounter of 09/13/23    Echo    Interpretation Summary    Left Ventricle: The left ventricle is normal in size. Normal wall thickness. There is concentric remodeling. Normal wall motion. There is normal systolic function with a visually estimated ejection fraction of 65 - 70%. Biplane (2D) method of discs ejection fraction is 64%. There is normal diastolic function.    Right Ventricle: Normal right ventricular cavity size. Wall thickness is normal. Right ventricle wall motion  is normal. Systolic function is normal.    Aortic Valve: The aortic valve is a trileaflet valve.    Mitral Valve: There is mild regurgitation.    Tricuspid Valve: There is mild regurgitation.    Pulmonary Artery: The estimated pulmonary artery systolic pressure is 31 mmHg.    IVC/SVC: Normal venous pressure at 3 mmHg.    [facility] No results found for this or any previous visit.        Lab Results   Component Value Date     03/07/2024    K 4.7 03/07/2024     03/07/2024    CO2 26 03/07/2024    BUN 26 (H) 03/07/2024    CREATININE 1.46 (H) 03/07/2024    CALCIUM 9.1 03/07/2024    ANIONGAP 17 (H) 03/07/2024       Lab Results   Component Value Date    CHOL 118 03/07/2024     Lab Results   Component Value Date     HDL 41 03/07/2024     Lab Results   Component Value Date    LDLCALC 52 03/07/2024     Lab Results   Component Value Date    TRIG 124 03/07/2024     Lab Results   Component Value Date    CHOLHDL 2.9 03/07/2024       Lab Results   Component Value Date    WBC 5.40 03/07/2024    HGB 11.8 (L) 03/07/2024    HCT 39.5 (L) 03/07/2024    MCV 96.1 (H) 03/07/2024     03/07/2024           Current Outpatient Medications:     amLODIPine (NORVASC) 5 MG tablet, Take 5 mg by mouth once daily., Disp: , Rfl:     ascorbic acid, vitamin C, (VITAMIN C) 1000 MG tablet, Take 1,000 mg by mouth once daily., Disp: , Rfl:     atorvastatin (LIPITOR) 40 MG tablet, Take 1 tablet (40 mg total) by mouth once daily., Disp: 90 tablet, Rfl: 3    carvediloL (COREG) 6.25 MG tablet, TAKE 1 TABLET BY MOUTH 2 TIMES A DAY WITH MEALS, Disp: 180 tablet, Rfl: 1    clopidogreL (PLAVIX) 75 mg tablet, TAKE 1 TABLET BY MOUTH DAILY., Disp: 30 tablet, Rfl: 6    coenzyme Q10 100 mg capsule, Take 100 mg by mouth once daily., Disp: , Rfl:     cyanocobalamin, vitamin B-12, 5,000 mcg TbDL, Take 50,000 mcg by mouth Daily., Disp: , Rfl:     meloxicam (MOBIC) 7.5 MG tablet, Take 15 mg by mouth Daily., Disp: , Rfl:     metFORMIN (GLUCOPHAGE) 500 MG tablet, Take 500 mg by mouth 2 (two) times daily., Disp: , Rfl:     nitrofurantoin, macrocrystal-monohydrate, (MACROBID) 100 MG capsule, Take 50 mg by mouth once daily., Disp: , Rfl:     omega-3 fatty acids/fish oil (FISH OIL-OMEGA-3 FATTY ACIDS) 300-1,000 mg capsule, Take by mouth once daily., Disp: , Rfl:     pantoprazole (PROTONIX) 20 MG tablet, Take 20 mg by mouth once daily., Disp: , Rfl:     sertraline (ZOLOFT) 50 MG tablet, Take 50 mg by mouth., Disp: , Rfl:     tamsulosin (FLOMAX) 0.4 mg Cap, Take 1 capsule by mouth once daily., Disp: , Rfl:     testosterone cypionate (DEPOTESTOTERONE CYPIONATE) 200 mg/mL injection, Inject 1/2 ml intramuscularly once every two weeks, Disp: , Rfl:     traZODone (DESYREL) 50 MG tablet,  "Take 1 tablet by mouth every evening., Disp: , Rfl:     vitamin D3-folic acid 125 mcg (5,000 unit)-1 mg Tab, Take 125 mcg by mouth Daily., Disp: , Rfl:     zinc gluconate 50 mg tablet, Take 50 mg by mouth once daily., Disp: , Rfl:     ranolazine (RANEXA) 500 MG Tb12, Take 1 tablet (500 mg total) by mouth 2 (two) times daily., Disp: 180 tablet, Rfl: 1    Review of Systems   Constitutional:  Positive for malaise/fatigue. Negative for chills and fever.   Respiratory:  Positive for shortness of breath. Negative for cough.    Cardiovascular:  Positive for chest pain and leg swelling. Negative for palpitations, orthopnea and PND.   Gastrointestinal:  Negative for abdominal pain, nausea and vomiting.   Musculoskeletal:  Negative for falls.   Neurological:  Negative for focal weakness and weakness.         Objective:   /78 (BP Location: Left arm, Patient Position: Sitting)   Pulse 62   Ht 5' 10" (1.778 m)   Wt 85.3 kg (188 lb)   SpO2 96%   BMI 26.98 kg/m²     Physical Exam  Constitutional:       General: He is not in acute distress.     Appearance: Normal appearance.   Cardiovascular:      Rate and Rhythm: Normal rate and regular rhythm.      Heart sounds: No murmur heard.  Pulmonary:      Effort: Pulmonary effort is normal.      Breath sounds: Normal breath sounds.   Chest:      Comments: Well healing midline incision, well approximated with no s/sx of infection  Musculoskeletal:      Cervical back: Neck supple. No rigidity.      Right lower leg: Edema present.      Left lower leg: Edema present.   Skin:     General: Skin is warm and dry.      Comments: Well healing incisions to inner left forearm and upper left leg, free of s/sx of infection.   Neurological:      Mental Status: He is alert and oriented to person, place, and time.   Psychiatric:         Mood and Affect: Mood normal.         Behavior: Behavior normal.           Assessment:     1. Coronary artery disease involving native coronary artery of native " heart, unspecified whether angina present  NM Myocardial Perfusion Spect Multi Pharmacologic    Nuclear Stress Test    Echo    Basic Metabolic Panel    CBC Auto Differential    NT-Pro Natriuretic Peptide    Magnesium    Vitamin B12    Vitamin D      2. Secondary hypertension  EKG 12-lead    Basic Metabolic Panel    CBC Auto Differential    NT-Pro Natriuretic Peptide    Magnesium    Vitamin B12    Vitamin D      3. Hyperlipidemia, unspecified hyperlipidemia type        4. Chest pain, unspecified type        5. Anginal equivalent  NM Myocardial Perfusion Spect Multi Pharmacologic    Nuclear Stress Test    Echo    Basic Metabolic Panel    CBC Auto Differential    NT-Pro Natriuretic Peptide    Magnesium    Vitamin B12    Vitamin D      6. Bilateral edema of lower extremity  Echo    Basic Metabolic Panel    CBC Auto Differential    NT-Pro Natriuretic Peptide    Magnesium    Vitamin B12    Vitamin D      7. Vitamin D deficiency, unspecified  Vitamin D      8. Other long term (current) drug therapy  Vitamin B12      9. Fatigue, unspecified type              Plan:   Coronary artery disease involving native coronary artery of native heart  - s/p CABG at [facility] on 1/20/24 with Dr. Vergara  - complaint of intermittent sharp left-sided chest pain which is his anginal equivalent - denies chest pain on exam today  - EKG is unremarkable  - Lexiscan ordered  - start Ranexa 500 mg b.i.d. (stated Imdur gave him a headache and stopped taking it)  - aspirin discontinued  - continue Plavix (acid reflux), high intensity statin along with Coreg 6.25 mg b.i.d.  - also on amlodipine 5 mg daily    Hypertension  - well controlled on current meds    Hyperlipidemia  LDL is at goal   Continue atorvastatin 40 mg daily    Fatigue  Complaint of weakness and fatigue, low energy  Also with complaint of anginal equivalent  Labs today  Echo and Lexiscan ordered    Anginal equivalent  Has complaint of intermittent anginal equivalent  Denies chest pain on  exam today  EKG is unremarkable  Lexiscan ordered  Start Ranexa 500 mg b.i.d. (pt stated Imdur caused HA)        Labs today  Follow-up with Dr. Orozco in 3 months

## 2024-11-13 NOTE — ASSESSMENT & PLAN NOTE
Complaint of weakness and fatigue, low energy  Also with complaint of anginal equivalent  Labs today  Echo and Lexiscan ordered

## 2024-11-13 NOTE — PATIENT INSTRUCTIONS
Labs today  Start Ranexa 500mg twice a day  Stress test as outpatient  Follow up with Dr. Orozco in 3 months

## 2024-11-15 ENCOUNTER — TELEPHONE (OUTPATIENT)
Dept: CARDIOLOGY | Facility: CLINIC | Age: 64
End: 2024-11-15
Payer: MEDICARE

## 2024-11-15 NOTE — TELEPHONE ENCOUNTER
Called patient, gave lab results to spouse. She has questions about medication and dosage, I will have Audrey call her back Monday morning    ----- Message from DEBI Ortega sent at 11/14/2024  9:12 AM CST -----  Please call and let him know that his labs are all normal including his vitamin B12 and vitamin D levels. Thank you! :)

## 2024-11-16 LAB
OHS QRS DURATION: 106 MS
OHS QTC CALCULATION: 420 MS

## 2024-11-19 ENCOUNTER — TELEPHONE (OUTPATIENT)
Dept: CARDIOLOGY | Facility: CLINIC | Age: 64
End: 2024-11-19
Payer: MEDICARE

## 2024-11-19 NOTE — TELEPHONE ENCOUNTER
Call spouse back, Renexa  is not harmful fro the kidney and if she wants to let patient take 1 per day that's fine but it is prescribed b.I.d.     ----- Message from Med Assistant Donovan sent at 11/14/2024 10:57 AM CST -----  Who Called: Kimber (wife)    Caller is requesting assistance/information from provider's office.      Preferred Method of Contact: Phone Call  Patient's Preferred Phone Number on File: [phone]   Best Call Back Number, if different: [phone]  Additional Information: about the medication that was prescribes, is it ok for kidneys/ can take it 1x day?

## 2024-11-20 ENCOUNTER — TELEPHONE (OUTPATIENT)
Dept: CARDIOLOGY | Facility: CLINIC | Age: 64
End: 2024-11-20
Payer: MEDICARE

## 2024-12-05 ENCOUNTER — HOSPITAL ENCOUNTER (OUTPATIENT)
Dept: CARDIOLOGY | Facility: HOSPITAL | Age: 64
Discharge: HOME | End: 2024-12-05
Attending: NURSE PRACTITIONER
Payer: MEDICARE

## 2024-12-05 ENCOUNTER — HOSPITAL ENCOUNTER (OUTPATIENT)
Dept: RADIOLOGY | Facility: HOSPITAL | Age: 64
Discharge: HOME | End: 2024-12-05
Attending: NURSE PRACTITIONER
Payer: MEDICARE

## 2024-12-05 DIAGNOSIS — I20.89 ANGINAL EQUIVALENT: ICD-10-CM

## 2024-12-05 DIAGNOSIS — I25.10 CORONARY ARTERY DISEASE INVOLVING NATIVE CORONARY ARTERY OF NATIVE HEART, UNSPECIFIED WHETHER ANGINA PRESENT: ICD-10-CM

## 2024-12-05 DIAGNOSIS — R60.0 BILATERAL EDEMA OF LOWER EXTREMITY: ICD-10-CM

## 2024-12-05 LAB
AORTIC ROOT ANNULUS: 3.2 CM
AORTIC VALVE CUSP SEPERATION: 2.36 CM
AV INDEX (PROSTH): 0.85
AV MEAN GRADIENT: 3.6 MMHG
AV PEAK GRADIENT: 9 MMHG
AV VALVE AREA BY VELOCITY RATIO: 2.1 CM²
AV VALVE AREA: 2.4 CM²
AV VELOCITY RATIO: 0.73
CV ECHO LV RWT: 0.28 CM
CV STRESS BASE HR: 62 BPM
DIASTOLIC BLOOD PRESSURE: 104 MMHG
DOP CALC AO PEAK VEL: 1.5 M/S
DOP CALC AO VTI: 28.8 CM
DOP CALC LVOT AREA: 2.8 CM2
DOP CALC LVOT DIAMETER: 1.9 CM
DOP CALC LVOT PEAK VEL: 1.1 M/S
DOP CALC LVOT STROKE VOLUME: 69.1 CM3
DOP CALCLVOT PEAK VEL VTI: 24.4 CM
E WAVE DECELERATION TIME: 230.66 MSEC
E/A RATIO: 0.89
E/E' RATIO: 7.16 M/S
ECHO LV POSTERIOR WALL: 0.8 CM (ref 0.6–1.1)
FRACTIONAL SHORTENING: 35.1 % (ref 28–44)
INTERVENTRICULAR SEPTUM: 0.8 CM (ref 0.6–1.1)
IVC DIAMETER: 1.87 CM
LEFT ATRIUM AREA SYSTOLIC (APICAL 2 CHAMBER): 20.44 CM2
LEFT ATRIUM AREA SYSTOLIC (APICAL 4 CHAMBER): 22.92 CM2
LEFT ATRIUM VOLUME MOD: 71.04 ML
LEFT INTERNAL DIMENSION IN SYSTOLE: 3.7 CM (ref 2.1–4)
LEFT VENTRICLE DIASTOLIC VOLUME: 161.37 ML
LEFT VENTRICLE END SYSTOLIC VOLUME APICAL 2 CHAMBER: 64.15 ML
LEFT VENTRICLE END SYSTOLIC VOLUME APICAL 4 CHAMBER: 65.88 ML
LEFT VENTRICLE SYSTOLIC VOLUME: 57.16 ML
LEFT VENTRICULAR INTERNAL DIMENSION IN DIASTOLE: 5.7 CM (ref 3.5–6)
LEFT VENTRICULAR MASS: 170.2 G
LV LATERAL E/E' RATIO: 6.8 M/S
LV SEPTAL E/E' RATIO: 7.56 M/S
LVED V (TEICH): 161.37 ML
LVES V (TEICH): 57.16 ML
LVOT MG: 2.12 MMHG
LVOT MV: 0.67 CM/S
MV PEAK A VEL: 0.76 M/S
MV PEAK E VEL: 0.68 M/S
MV STENOSIS PRESSURE HALF TIME: 66.89 MS
MV VALVE AREA P 1/2 METHOD: 3.29 CM2
OHS CV CPX 1 MINUTE RECOVERY HEART RATE: 69 BPM
OHS CV CPX 85 PERCENT MAX PREDICTED HEART RATE MALE: 131
OHS CV CPX MAX PREDICTED HEART RATE: 154
OHS CV CPX PATIENT IS FEMALE: 0
OHS CV CPX PATIENT IS MALE: 1
OHS CV CPX PEAK DIASTOLIC BLOOD PRESSURE: 94 MMHG
OHS CV CPX PEAK HEAR RATE: 73 BPM
OHS CV CPX PEAK RATE PRESSURE PRODUCT: NORMAL
OHS CV CPX PEAK SYSTOLIC BLOOD PRESSURE: 160 MMHG
OHS CV CPX PERCENT MAX PREDICTED HEART RATE ACHIEVED: 47
OHS CV CPX RATE PRESSURE PRODUCT PRESENTING: 9486
OHS CV RV/LV RATIO: 0.74 CM
PISA TR MAX VEL: 2.26 M/S
PV PEAK GRADIENT: 4 MMHG
PV PEAK VELOCITY: 1.06 M/S
RA VOL SYS: 51.08 ML
RIGHT ATRIAL AREA: 18.8 CM2
RIGHT ATRIUM VOLUME AREA LENGTH APICAL 4 CHAMBER: 49.56 ML
RIGHT VENTRICLE DIASTOLIC BASEL DIMENSION: 4.2 CM
RIGHT VENTRICLE DIASTOLIC LENGTH: 9 CM
RIGHT VENTRICLE DIASTOLIC MID DIMENSION: 2.6 CM
RIGHT VENTRICULAR LENGTH IN DIASTOLE (APICAL 4-CHAMBER VIEW): 9 CM
RV MID DIAMA: 2.61 CM
STRESS ECHO POST EXERCISE DUR MIN: 1 MINUTES
STRESS ECHO POST EXERCISE DUR SEC: 34 SECONDS
SYSTOLIC BLOOD PRESSURE: 153 MMHG
TDI LATERAL: 0.1 M/S
TDI SEPTAL: 0.09 M/S
TDI: 0.1 M/S
TR MAX PG: 20 MMHG
TRICUSPID ANNULAR PLANE SYSTOLIC EXCURSION: 2.31 CM

## 2024-12-05 PROCEDURE — 93018 CV STRESS TEST I&R ONLY: CPT | Mod: ,,, | Performed by: INTERNAL MEDICINE

## 2024-12-05 PROCEDURE — 78452 HT MUSCLE IMAGE SPECT MULT: CPT | Mod: 26,,, | Performed by: HOSPITALIST

## 2024-12-05 PROCEDURE — 93306 TTE W/DOPPLER COMPLETE: CPT | Mod: 26,,, | Performed by: INTERNAL MEDICINE

## 2024-12-05 PROCEDURE — 78452 HT MUSCLE IMAGE SPECT MULT: CPT | Mod: TC

## 2024-12-05 PROCEDURE — 93017 CV STRESS TEST TRACING ONLY: CPT

## 2024-12-05 PROCEDURE — 93016 CV STRESS TEST SUPVJ ONLY: CPT | Mod: ,,, | Performed by: INTERNAL MEDICINE

## 2024-12-05 PROCEDURE — A9500 TC99M SESTAMIBI: HCPCS | Performed by: NURSE PRACTITIONER

## 2024-12-05 PROCEDURE — 63600175 PHARM REV CODE 636 W HCPCS: Performed by: NURSE PRACTITIONER

## 2024-12-05 PROCEDURE — 93306 TTE W/DOPPLER COMPLETE: CPT

## 2024-12-05 RX ORDER — TETRAKIS(2-METHOXYISOBUTYLISOCYANIDE)COPPER(I) TETRAFLUOROBORATE 1 MG/ML
11.2 INJECTION, POWDER, LYOPHILIZED, FOR SOLUTION INTRAVENOUS
Status: COMPLETED | OUTPATIENT
Start: 2024-12-05 | End: 2024-12-05

## 2024-12-05 RX ORDER — TETRAKIS(2-METHOXYISOBUTYLISOCYANIDE)COPPER(I) TETRAFLUOROBORATE 1 MG/ML
32.4 INJECTION, POWDER, LYOPHILIZED, FOR SOLUTION INTRAVENOUS
Status: COMPLETED | OUTPATIENT
Start: 2024-12-05 | End: 2024-12-05

## 2024-12-05 RX ORDER — REGADENOSON 0.08 MG/ML
0.4 INJECTION, SOLUTION INTRAVENOUS
Status: COMPLETED | OUTPATIENT
Start: 2024-12-05 | End: 2024-12-05

## 2024-12-05 RX ADMIN — REGADENOSON 0.4 MG: 0.08 INJECTION, SOLUTION INTRAVENOUS at 10:12

## 2024-12-05 RX ADMIN — KIT FOR THE PREPARATION OF TECHNETIUM TC99M SESTAMIBI 11.2 MILLICURIE: 1 INJECTION, POWDER, LYOPHILIZED, FOR SOLUTION PARENTERAL at 09:12

## 2024-12-05 RX ADMIN — KIT FOR THE PREPARATION OF TECHNETIUM TC99M SESTAMIBI 32.4 MILLICURIE: 1 INJECTION, POWDER, LYOPHILIZED, FOR SOLUTION PARENTERAL at 10:12

## 2024-12-06 ENCOUNTER — TELEPHONE (OUTPATIENT)
Dept: CARDIOLOGY | Facility: CLINIC | Age: 64
End: 2024-12-06
Payer: MEDICARE

## 2024-12-06 NOTE — TELEPHONE ENCOUNTER
Returned call to patient, no answer, no voicemail    ----- Message from Izabella sent at 11/18/2024 12:36 PM CST -----  Regarding: Pt. inquiry about Rx  Who Called: Bradly Coffey's spouse    Caller is requesting assistance/information from provider's office.    Rx: ranolazine (RANEXA) 500 MG Tb12 180 tablet 1 11/13/2024 11/13/2025 No  Sig - Route: Take 1 tablet (500 mg total) by mouth 2 (two) times daily. - Oral      Preferred Method of Contact: Phone Call  Patient's Preferred Phone Number on File: [phone]   Best Call Back Number, if different:  Additional Information: Pt.'s wife stated if the pt. Could take half of his required daily Rx. She is concerned about his kidneys (side effects of Rx).

## 2024-12-06 NOTE — TELEPHONE ENCOUNTER
Number has be changed or dfisconnected    ----- Message from DEBI Ortega sent at 12/6/2024  9:02 AM CST -----  Please call and let the patient know his echo and his stress test are normal.

## 2024-12-12 ENCOUNTER — TELEPHONE (OUTPATIENT)
Dept: CARDIOLOGY | Facility: CLINIC | Age: 64
End: 2024-12-12
Payer: MEDICARE

## 2024-12-12 NOTE — TELEPHONE ENCOUNTER
Call made no answer message left on VM to call office.----- Message from Yolanda sent at 12/12/2024  9:27 AM CST -----  Regarding: test results  Who Called: Bradly Coffey    Caller is requesting information on test results.    Name of Test (Lab/Mammo/Etc): Stress Test, echo  Date of Test: 12/5  Where the test was performed:  [facility]  Ordering Provider: Audrey    Preferred Method of Contact: Phone Call  Patient's Preferred Phone Number on File: [phone]     Additional Information: Mr. Abdullahi is calling regarding his test results from 12/5. Ordered by Dr. Ventura.

## 2024-12-12 NOTE — TELEPHONE ENCOUNTER
Pt.'s wife informed pt.'s stress test and echo are normal per Courtney Ventura NP. Pt.'s wife stated pt. Chest pain is better but uncertain as to why medications were changed and unsure if chest pain could be r/t his heart. Appt. Offered to pt.'s wife for pt. To be seen in clinic tomorrow or next week per Dr. Orozco. Pt.'s wife declined. D/W pt.'s wife will call back and give sooner appt. Than 2months. Advised pt.'s wife pt. To go to ER if needed for chest pain, sob or any other problems or concerns. Pt.'s wife voiced understanding.

## 2025-01-27 ENCOUNTER — OFFICE VISIT (OUTPATIENT)
Dept: CARDIOLOGY | Facility: CLINIC | Age: 65
End: 2025-01-27
Payer: MEDICARE

## 2025-01-27 VITALS
HEIGHT: 70 IN | SYSTOLIC BLOOD PRESSURE: 120 MMHG | BODY MASS INDEX: 27.2 KG/M2 | RESPIRATION RATE: 18 BRPM | DIASTOLIC BLOOD PRESSURE: 60 MMHG | WEIGHT: 190 LBS | HEART RATE: 64 BPM

## 2025-01-27 DIAGNOSIS — E78.5 HYPERLIPIDEMIA, UNSPECIFIED HYPERLIPIDEMIA TYPE: ICD-10-CM

## 2025-01-27 DIAGNOSIS — F32.A DEPRESSION, UNSPECIFIED DEPRESSION TYPE: ICD-10-CM

## 2025-01-27 DIAGNOSIS — I25.10 CORONARY ARTERY DISEASE INVOLVING NATIVE CORONARY ARTERY OF NATIVE HEART, UNSPECIFIED WHETHER ANGINA PRESENT: Primary | ICD-10-CM

## 2025-01-27 PROCEDURE — 99214 OFFICE O/P EST MOD 30 MIN: CPT | Mod: PBBFAC | Performed by: STUDENT IN AN ORGANIZED HEALTH CARE EDUCATION/TRAINING PROGRAM

## 2025-01-27 PROCEDURE — 99214 OFFICE O/P EST MOD 30 MIN: CPT | Mod: S$PBB,,, | Performed by: STUDENT IN AN ORGANIZED HEALTH CARE EDUCATION/TRAINING PROGRAM

## 2025-01-27 PROCEDURE — 99999 PR PBB SHADOW E&M-EST. PATIENT-LVL IV: CPT | Mod: PBBFAC,,, | Performed by: STUDENT IN AN ORGANIZED HEALTH CARE EDUCATION/TRAINING PROGRAM

## 2025-01-27 RX ORDER — SERTRALINE HYDROCHLORIDE 100 MG/1
100 TABLET, FILM COATED ORAL DAILY
Qty: 90 TABLET | Refills: 3 | Status: SHIPPED | OUTPATIENT
Start: 2025-01-27 | End: 2026-01-27

## 2025-01-27 NOTE — PROGRESS NOTES
PCP: Vane, Primary Doctor    Referring Provider:     Subjective:   Bradly Coffey is a 64 y.o. male with hx of   CAD s/p  CABG x3 at [facility] 01/2024, HTN, HLD, DM2 who presents for follow up.    1/27/25 - Continues to have atypical sharp, left sided chest pain that last for a few seconds. Taking ranexa once a day. Reports easy irritablity and is on zoloft and welbutrin.     11/13/24 - patient has complaint of intermittent sharp pain to his left chest which he states is the same pain he had before his CABG.  He denies chest pain on exam today.  Also has complaint of increased shortness of breath with exertion and BLE edema.  He denies orthopnea or PND.  He states he has no energy and feels fatigued all the time.  He states his legs feel weak when he walks but denies pain with walking.  He had a sleep study that showed mild sleep apnea.  He is going to  his CPAP machine today.  On exam, he has 1+ edema to his BLE.  EKG shows sinus bradycardia with nonspecific T-wave abnormality.    5/8/24 (Seen by Dr. Orozco)- Doing well. No CP. Finished with cardiac rehab. Reports acid reflux symptoms. Taking mobic everyday.     3/2024 - Pt was seen by Dr. Orozco 08/2023 for evaluation of chest pain along with SOB and fatigue. Echo showed normal EF of 65-70% with no significant WMA and no significant valvular abnormalities. Stress test was abnormal. He was then sent for cardiac CTA which showed plaque with moderate to severe stenosis. LHC revealed severe distal LM lesion involving the trifurcation of the LAD, LCX and RI. He then ultimately underwent CABG as stated above.     Today, pt denies any chest pain/pressure/tightness or SOB. He is accompanied by his wife who states pt has SOB with and without exertion some afternoons. He denies any palpitations, diaphoresis, N/V, edema, orthopnea or PND. He starts cardiac rehab today. He has well healing incisions to his midline chest, to his left inner forearm and to his upper left  leg. All are well approximated and free of any s/sx of infection.         Fhx: Non-contributory  Shx: Denies any smoking, etoh or drug use    EKG   Results for orders placed or performed in visit on 11/13/24   EKG 12-lead    Collection Time: 11/13/24 10:32 AM   Result Value Ref Range    QRS Duration 106 ms    [facility] QTC Calculation 420 ms    Narrative    Test Reason : I15.9,    Vent. Rate :  57 BPM     Atrial Rate :  57 BPM     P-R Int : 174 ms          QRS Dur : 106 ms      QT Int : 432 ms       P-R-T Axes :  74  88 140 degrees    QTcB Int : 420 ms    Sinus bradycardia  Nonspecific T wave abnormality  Abnormal ECG  When compared with ECG of 08-May-2024 11:16,  No significant change was found  Confirmed by Denis Orozco ([id number]) on 11/16/2024 11:07:22 AM    Referred By:            Confirmed By: Denis Orozco     ECHO Results for orders placed during the hospital encounter of 09/13/23    Echo    Interpretation Summary    Left Ventricle: The left ventricle is normal in size. Normal wall thickness. There is concentric remodeling. Normal wall motion. There is normal systolic function with a visually estimated ejection fraction of 65 - 70%. Biplane (2D) method of discs ejection fraction is 64%. There is normal diastolic function.    Right Ventricle: Normal right ventricular cavity size. Wall thickness is normal. Right ventricle wall motion  is normal. Systolic function is normal.    Aortic Valve: The aortic valve is a trileaflet valve.    Mitral Valve: There is mild regurgitation.    Tricuspid Valve: There is mild regurgitation.    Pulmonary Artery: The estimated pulmonary artery systolic pressure is 31 mmHg.    IVC/SVC: Normal venous pressure at 3 mmHg.    [facility] 12/2023  There is a severe distal left main lesion that involves the trifurcation   of LAD, LCX, and RI.   RCA has an eccentric plaque proximally resulting in moderate stenosis   which is hemodynamically insignificant. .    ECHO Results for orders placed during  the hospital encounter of 12/05/24    Echo    Interpretation Summary    Left Ventricle: The left ventricle is normal in size. Normal wall thickness. There is normal systolic function with a visually estimated ejection fraction of 55 - 60%. There is normal diastolic function.    Right Ventricle: Normal right ventricular cavity size. Systolic function is normal.    Stress Results for orders placed during the hospital encounter of 12/05/24    Nuclear Stress Test    Interpretation Summary    The ECG portion of the study is negative for ischemia.    The patient reported no chest pain during the stress test.    During stress, occasional PVCs are noted.  1. Scintigraphically negative for ischemia or infarct..  2. the global left ventricular systolic function is low normal with an LV ejection fraction of 50-55 % and borderline LV dilatation, borderline t.i.d. (1.13), however neither meet criteria.  Wall motion is globally hypokinetic.      Lab Results   Component Value Date     11/13/2024    K 4.7 11/13/2024     (H) 11/13/2024    CO2 24 11/13/2024    BUN 26 11/13/2024    CREATININE 1.23 11/13/2024    CALCIUM 8.5 (L) 11/13/2024    ANIONGAP 13 11/13/2024       Lab Results   Component Value Date    CHOL 118 03/07/2024     Lab Results   Component Value Date    HDL 41 03/07/2024     Lab Results   Component Value Date    LDLCALC 52 03/07/2024     Lab Results   Component Value Date    TRIG 124 03/07/2024     Lab Results   Component Value Date    CHOLHDL 2.9 03/07/2024       Lab Results   Component Value Date    WBC 6.00 11/13/2024    HGB 14.9 11/13/2024    HCT 47.1 11/13/2024    MCV 93.3 11/13/2024     11/13/2024           Current Outpatient Medications:     amLODIPine (NORVASC) 5 MG tablet, Take 5 mg by mouth once daily., Disp: , Rfl:     ascorbic acid, vitamin C, (VITAMIN C) 1000 MG tablet, Take 1,000 mg by mouth once daily., Disp: , Rfl:     atorvastatin (LIPITOR) 40 MG tablet, Take 1 tablet (40 mg total) by  mouth once daily., Disp: 90 tablet, Rfl: 3    carvediloL (COREG) 6.25 MG tablet, TAKE 1 TABLET BY MOUTH 2 TIMES A DAY WITH MEALS, Disp: 180 tablet, Rfl: 1    clopidogreL (PLAVIX) 75 mg tablet, TAKE 1 TABLET BY MOUTH DAILY., Disp: 30 tablet, Rfl: 6    coenzyme Q10 100 mg capsule, Take 100 mg by mouth once daily., Disp: , Rfl:     cyanocobalamin, vitamin B-12, 5,000 mcg TbDL, Take 50,000 mcg by mouth Daily., Disp: , Rfl:     meloxicam (MOBIC) 7.5 MG tablet, Take 15 mg by mouth Daily., Disp: , Rfl:     metFORMIN (GLUCOPHAGE) 500 MG tablet, Take 500 mg by mouth 2 (two) times daily., Disp: , Rfl:     nitrofurantoin, macrocrystal-monohydrate, (MACROBID) 100 MG capsule, Take 50 mg by mouth once daily., Disp: , Rfl:     omega-3 fatty acids/fish oil (FISH OIL-OMEGA-3 FATTY ACIDS) 300-1,000 mg capsule, Take by mouth once daily., Disp: , Rfl:     pantoprazole (PROTONIX) 20 MG tablet, Take 20 mg by mouth once daily., Disp: , Rfl:     ranolazine (RANEXA) 500 MG Tb12, Take 1 tablet (500 mg total) by mouth 2 (two) times daily., Disp: 180 tablet, Rfl: 1    sertraline (ZOLOFT) 100 MG tablet, Take 1 tablet (100 mg total) by mouth once daily., Disp: 90 tablet, Rfl: 3    tamsulosin (FLOMAX) 0.4 mg Cap, Take 1 capsule by mouth once daily., Disp: , Rfl:     testosterone cypionate (DEPOTESTOTERONE CYPIONATE) 200 mg/mL injection, Inject 1/2 ml intramuscularly once every two weeks, Disp: , Rfl:     traZODone (DESYREL) 50 MG tablet, Take 1 tablet by mouth every evening., Disp: , Rfl:     vitamin D3-folic acid 125 mcg (5,000 unit)-1 mg Tab, Take 125 mcg by mouth Daily., Disp: , Rfl:     zinc gluconate 50 mg tablet, Take 50 mg by mouth once daily., Disp: , Rfl:     Review of Systems   Constitutional:  Positive for malaise/fatigue. Negative for chills and fever.   Respiratory:  Positive for shortness of breath. Negative for cough.    Cardiovascular:  Positive for chest pain and leg swelling. Negative for palpitations, orthopnea and PND.  "  Gastrointestinal:  Negative for abdominal pain, nausea and vomiting.   Musculoskeletal:  Negative for falls.   Neurological:  Negative for focal weakness and weakness.         Objective:   /60   Pulse 64   Resp 18   Ht 5' 10" (1.778 m)   Wt 86.2 kg (190 lb)   BMI 27.26 kg/m²     Physical Exam  Constitutional:       General: He is not in acute distress.     Appearance: Normal appearance.   Cardiovascular:      Rate and Rhythm: Normal rate and regular rhythm.      Heart sounds: No murmur heard.  Pulmonary:      Effort: Pulmonary effort is normal.      Breath sounds: Normal breath sounds.   Chest:      Comments: Well healing midline incision, well approximated with no s/sx of infection  Musculoskeletal:      Cervical back: Neck supple. No rigidity.      Right lower leg: Edema present.      Left lower leg: Edema present.   Skin:     General: Skin is warm and dry.      Comments: Well healing incisions to inner left forearm and upper left leg, free of s/sx of infection.   Neurological:      Mental Status: He is alert and oriented to person, place, and time.   Psychiatric:         Mood and Affect: Mood normal.         Behavior: Behavior normal.           Assessment:     1. Coronary artery disease involving native coronary artery of native heart, unspecified whether angina present        2. Hyperlipidemia, unspecified hyperlipidemia type        3. Depression, unspecified depression type                Plan:   Coronary artery disease involving native coronary artery of native heart  - s/p CABG at [facility] on 1/20/24 with Dr. Vergara  -  Stop aspirin; continue Plavix (acid reflux)  high intensity statin along with lisinopril, metoprolol, imdur  - Lexiscan was normal without ischemia      Hyperlipidemia  Continue lipitor 40%  LDL goal < 70    Depression  Increase zoloft to 100mg qd                "

## 2025-01-27 NOTE — ASSESSMENT & PLAN NOTE
- s/p CABG at [facility] on 1/20/24 with Dr. Vergara  -  Stop aspirin; continue Plavix (acid reflux)  high intensity statin along with lisinopril, metoprolol, imdur  - Lexiscan was normal without ischemia

## 2025-02-18 ENCOUNTER — TELEPHONE (OUTPATIENT)
Dept: CARDIOLOGY | Facility: CLINIC | Age: 65
End: 2025-02-18
Payer: MEDICARE

## 2025-02-18 NOTE — TELEPHONE ENCOUNTER
Advised pt. Wife to notify pcp for refills. Pt.'s wife voiced understanding.----- Message from Tech Laturina sent at 2/18/2025  9:32 AM CST -----  Who Called: TASNEEM (WIFE)Caller is requesting assistance/information from provider's office.Preferred Method of Contact: Phone CallPatient's Preferred Phone Number on File: [phone] Best Call Back Number, if different:Additional Information: Patient wanted to know if the ZOLOFT could be written for 50 mg twice a day, instead of 100 mg once a day, until he gets use to the medication.

## 2025-03-19 RX ORDER — CARVEDILOL 6.25 MG/1
6.25 TABLET ORAL 2 TIMES DAILY WITH MEALS
Qty: 180 TABLET | Refills: 1 | Status: SHIPPED | OUTPATIENT
Start: 2025-03-19

## 2025-03-19 RX ORDER — CLOPIDOGREL BISULFATE 75 MG/1
75 TABLET ORAL
Qty: 30 TABLET | Refills: 6 | Status: SHIPPED | OUTPATIENT
Start: 2025-03-19

## 2025-03-27 ENCOUNTER — OFFICE VISIT (OUTPATIENT)
Dept: DERMATOLOGY | Facility: CLINIC | Age: 65
End: 2025-03-27
Payer: MEDICARE

## 2025-03-27 DIAGNOSIS — D48.9 NEOPLASM OF UNCERTAIN BEHAVIOR: Primary | ICD-10-CM

## 2025-03-27 DIAGNOSIS — L57.0 ACTINIC KERATOSES: ICD-10-CM

## 2025-03-27 DIAGNOSIS — L82.1 SEBORRHEIC KERATOSES: ICD-10-CM

## 2025-03-27 DIAGNOSIS — Z85.828 HISTORY OF NONMELANOMA SKIN CANCER: ICD-10-CM

## 2025-03-27 NOTE — PROGRESS NOTES
[facility]  Selvin Amador MD    [address] [place] [address]  [phone]    Fax: [phone]    Patient Name: Bradly Coffey  Medical Record Number: [id number]  PCP: Vane, Primary Doctor  Age: 65 y.o. : 1960  Contact: [phone] (home)     History of Present Illness:     Bradly Coffey is a 65 y.o.  male here for follow up of hx of NMSC (unsure of type L hand 2018) and Aks which were treated with LN2. Today, he is concerned about a scaly lesion on the right temple, left temple and dry lesions on the feet.     The patient has no other concerns today.    Review of Systems:     Unremarkable other than mentioned above.     Physical Exam:     General: Relaxed, oriented, alert    Skin examination of the scalp, face, neck, chest, back, abdomen, upper extremities and lower extremities were normal except for as listed below      Assessment and Plan:     1. History of NMSC   Well-healed scar on left hand  No e/o recurrence   Recommend sunscreen and good photoprotection       2. Neoplasm of Uncertain Behavior   - irregular brown patch located on the left forearm   Ddx includes: Lentigo vs LM     Shave biopsy      Pre-procedure Diagnosis: as above  Post_procedure Diagnosis: same  Estimated Blood Loss: <1cc    Findings: None  Complications: None  Specimens: to pathology      Written informed consent was obtained after discussing risks including pain, bleeding, infection, recurrence and scarring. The biopsy site was sterilely prepped with alcohol, which was allowed to dry completely, then locally infiltrated with 1% lidocaine with epinephrine, ~3 cc total. A shave biopsy was obtained using a Dermablade/15 and the specimen was sent to dermatopathology. Aluminum chloride was used for hemostasis. Antibiotic ointment and a clean dressing were applied. The patient tolerated the procedure well without complications. Verbal and written wound care instructions were given.    Selvin  MD Perry       3. Seborrheic keratoses   - brown stuck on appearing papules/plaques  - patient educated on benign nature. No treatment necessary unless they become irritated or inflamed     4. Actinic Keratoses  Erythematous, scaly papules on right ear, left ear, right hand,     Plan: Liquid Nitrogen.  A total of 4 lesions were treated with liquid nitrogen for 2 freeze-thaw cycles lasting 5 seconds, located on the above locations.   The patient's consent was obtained including but not limited to risks of crusting, scabbing,  blistering, scarring, darker or lighter pigmentary change, recurrence, incomplete removal and infection.    Counseling.  Sun protective clothing and broad spectrum sunscreen can prevent the formation of AK.   AKs can be treated with cryotherapy, photodynamic therapy, imiquimod, topical 5-FU.  Actinic Keratoses are precancerous proliferations that occur within sun damaged skin. If untreated,  a small subset of AKs can develop into Squamous Cell Carcinoma.    Selvin Amador MD   Mohs Surgery/Dermatologic Oncology  Dermatology

## 2025-03-31 ENCOUNTER — RESULTS FOLLOW-UP (OUTPATIENT)
Dept: DERMATOLOGY | Facility: CLINIC | Age: 65
End: 2025-03-31

## 2025-04-26 ENCOUNTER — HOSPITAL ENCOUNTER (EMERGENCY)
Facility: HOSPITAL | Age: 65
Discharge: HOME | End: 2025-04-27
Payer: MEDICARE

## 2025-04-26 DIAGNOSIS — N30.01 ACUTE CYSTITIS WITH HEMATURIA: Primary | ICD-10-CM

## 2025-04-26 DIAGNOSIS — Q64.33: ICD-10-CM

## 2025-04-26 DIAGNOSIS — R31.9 HEMATURIA, UNSPECIFIED TYPE: ICD-10-CM

## 2025-04-26 LAB
ALBUMIN SERPL BCP-MCNC: 4 G/DL (ref 3.4–4.8)
ALBUMIN/GLOB SERPL: 1.3 {RATIO}
ALP SERPL-CCNC: 86 U/L (ref 40–150)
ALT SERPL W P-5'-P-CCNC: 31 U/L
ANION GAP SERPL CALCULATED.3IONS-SCNC: 18 MMOL/L (ref 7–16)
APTT PPP: 24.6 SECONDS (ref 25.2–37.3)
AST SERPL W P-5'-P-CCNC: 28 U/L (ref 11–45)
BACTERIA #/AREA URNS HPF: ABNORMAL /HPF
BASOPHILS # BLD AUTO: 0.03 K/UL (ref 0–0.2)
BASOPHILS NFR BLD AUTO: 0.3 % (ref 0–1)
BILIRUB SERPL-MCNC: 0.5 MG/DL
BILIRUB UR QL STRIP: ABNORMAL
BUN SERPL-MCNC: 29 MG/DL (ref 8–26)
BUN/CREAT SERPL: 21 (ref 6–20)
CALCIUM SERPL-MCNC: 9.2 MG/DL (ref 8.8–10)
CHLORIDE SERPL-SCNC: 107 MMOL/L (ref 98–107)
CLARITY UR: ABNORMAL
CO2 SERPL-SCNC: 21 MMOL/L (ref 23–31)
COLOR UR: ABNORMAL
CREAT SERPL-MCNC: 1.37 MG/DL (ref 0.72–1.25)
DIFFERENTIAL METHOD BLD: ABNORMAL
EGFR (NO RACE VARIABLE) (RUSH/TITUS): 57 ML/MIN/1.73M2
EOSINOPHIL # BLD AUTO: 0.12 K/UL (ref 0–0.5)
EOSINOPHIL NFR BLD AUTO: 1.1 % (ref 1–4)
ERYTHROCYTE [DISTWIDTH] IN BLOOD BY AUTOMATED COUNT: 13 % (ref 11.5–14.5)
GLOBULIN SER-MCNC: 3.1 G/DL (ref 2–4)
GLUCOSE SERPL-MCNC: 198 MG/DL (ref 82–115)
GLUCOSE UR STRIP-MCNC: 250 MG/DL
HCT VFR BLD AUTO: 45.1 % (ref 40–54)
HGB BLD-MCNC: 14.3 G/DL (ref 13.5–18)
INR BLD: 1.04
KETONES UR STRIP-SCNC: 40 MG/DL
LEUKOCYTE ESTERASE UR QL STRIP: ABNORMAL
LYMPHOCYTES # BLD AUTO: 1.39 K/UL (ref 1–4.8)
LYMPHOCYTES NFR BLD AUTO: 12.2 % (ref 27–41)
MCH RBC QN AUTO: 30.1 PG (ref 27–31)
MCHC RBC AUTO-ENTMCNC: 31.7 G/DL (ref 32–36)
MCV RBC AUTO: 94.9 FL (ref 80–96)
MONOCYTES # BLD AUTO: 0.71 K/UL (ref 0–0.8)
MONOCYTES NFR BLD AUTO: 6.2 % (ref 2–6)
MPC BLD CALC-MCNC: 9.8 FL (ref 9.4–12.4)
NEUTROPHILS # BLD AUTO: 9.15 K/UL (ref 1.8–7.7)
NEUTROPHILS NFR BLD AUTO: 80.2 % (ref 53–65)
NITRITE UR QL STRIP: POSITIVE
PH UR STRIP: 7.5 PH UNITS
PLATELET # BLD AUTO: 179 K/UL (ref 150–400)
POTASSIUM SERPL-SCNC: 4.6 MMOL/L (ref 3.5–5.1)
PROT SERPL-MCNC: 7.1 G/DL (ref 5.8–7.6)
PROT UR QL STRIP: >=300
PROTHROMBIN TIME: 14.3 SECONDS (ref 11.7–14.7)
RBC # BLD AUTO: 4.75 M/UL (ref 4.6–6.2)
RBC # UR STRIP: ABNORMAL /UL
RBC #/AREA URNS HPF: ABNORMAL /HPF
SODIUM SERPL-SCNC: 141 MMOL/L (ref 136–145)
SP GR UR STRIP: 1.02
SQUAMOUS #/AREA URNS LPF: ABNORMAL /LPF
UROBILINOGEN UR STRIP-ACNC: >=8 MG/DL
WBC # BLD AUTO: 11.4 K/UL (ref 4.5–11)
WBC #/AREA URNS HPF: ABNORMAL /HPF

## 2025-04-26 PROCEDURE — 80053 COMPREHEN METABOLIC PANEL: CPT

## 2025-04-26 PROCEDURE — 36415 COLL VENOUS BLD VENIPUNCTURE: CPT

## 2025-04-26 PROCEDURE — 96375 TX/PRO/DX INJ NEW DRUG ADDON: CPT

## 2025-04-26 PROCEDURE — 96374 THER/PROPH/DIAG INJ IV PUSH: CPT

## 2025-04-26 PROCEDURE — 85610 PROTHROMBIN TIME: CPT

## 2025-04-26 PROCEDURE — 25000003 PHARM REV CODE 250

## 2025-04-26 PROCEDURE — 81003 URINALYSIS AUTO W/O SCOPE: CPT

## 2025-04-26 PROCEDURE — 99284 EMERGENCY DEPT VISIT MOD MDM: CPT | Mod: GF,,,

## 2025-04-26 PROCEDURE — 99284 EMERGENCY DEPT VISIT MOD MDM: CPT | Mod: 25

## 2025-04-26 PROCEDURE — 85025 COMPLETE CBC W/AUTO DIFF WBC: CPT

## 2025-04-26 PROCEDURE — 85730 THROMBOPLASTIN TIME PARTIAL: CPT

## 2025-04-26 PROCEDURE — 96361 HYDRATE IV INFUSION ADD-ON: CPT

## 2025-04-26 PROCEDURE — 63600175 PHARM REV CODE 636 W HCPCS

## 2025-04-26 RX ORDER — SULFAMETHOXAZOLE AND TRIMETHOPRIM 800; 160 MG/1; MG/1
1 TABLET ORAL 2 TIMES DAILY
Qty: 14 TABLET | Refills: 0 | Status: SHIPPED | OUTPATIENT
Start: 2025-04-26 | End: 2025-05-03

## 2025-04-26 RX ORDER — HYDROMORPHONE HYDROCHLORIDE 2 MG/ML
2 INJECTION, SOLUTION INTRAMUSCULAR; INTRAVENOUS; SUBCUTANEOUS
Status: COMPLETED | OUTPATIENT
Start: 2025-04-26 | End: 2025-04-26

## 2025-04-26 RX ORDER — PHENAZOPYRIDINE HYDROCHLORIDE 200 MG/1
200 TABLET, FILM COATED ORAL 3 TIMES DAILY
Qty: 6 TABLET | Refills: 0 | Status: SHIPPED | OUTPATIENT
Start: 2025-04-26 | End: 2025-05-06

## 2025-04-26 RX ORDER — MORPHINE SULFATE 4 MG/ML
4 INJECTION, SOLUTION INTRAMUSCULAR; INTRAVENOUS
Refills: 0 | Status: COMPLETED | OUTPATIENT
Start: 2025-04-26 | End: 2025-04-26

## 2025-04-26 RX ORDER — ONDANSETRON HYDROCHLORIDE 2 MG/ML
4 INJECTION, SOLUTION INTRAVENOUS
Status: COMPLETED | OUTPATIENT
Start: 2025-04-26 | End: 2025-04-26

## 2025-04-26 RX ORDER — ONDANSETRON 4 MG/1
4 TABLET, FILM COATED ORAL EVERY 6 HOURS
Qty: 12 TABLET | Refills: 0 | Status: SHIPPED | OUTPATIENT
Start: 2025-04-26 | End: 2025-04-26

## 2025-04-26 RX ORDER — DIAZEPAM 10 MG/2ML
5 INJECTION INTRAMUSCULAR
Status: COMPLETED | OUTPATIENT
Start: 2025-04-26 | End: 2025-04-26

## 2025-04-26 RX ORDER — CEFTRIAXONE 1 G/1
1 INJECTION, POWDER, FOR SOLUTION INTRAMUSCULAR; INTRAVENOUS
Status: COMPLETED | OUTPATIENT
Start: 2025-04-26 | End: 2025-04-26

## 2025-04-26 RX ORDER — PHENAZOPYRIDINE HYDROCHLORIDE 100 MG/1
200 TABLET, FILM COATED ORAL
Status: COMPLETED | OUTPATIENT
Start: 2025-04-26 | End: 2025-04-26

## 2025-04-26 RX ORDER — ONDANSETRON 4 MG/1
4 TABLET, FILM COATED ORAL EVERY 6 HOURS
Qty: 12 TABLET | Refills: 0 | Status: SHIPPED | OUTPATIENT
Start: 2025-04-26

## 2025-04-26 RX ADMIN — TRANEXAMIC ACID 1000 MG: 100 INJECTION, SOLUTION INTRAVENOUS at 09:04

## 2025-04-26 RX ADMIN — CEFTRIAXONE SODIUM 1 G: 1 INJECTION, POWDER, FOR SOLUTION INTRAMUSCULAR; INTRAVENOUS at 09:04

## 2025-04-26 RX ADMIN — ONDANSETRON 4 MG: 2 INJECTION INTRAMUSCULAR; INTRAVENOUS at 10:04

## 2025-04-26 RX ADMIN — SODIUM CHLORIDE 1000 ML: 9 INJECTION, SOLUTION INTRAVENOUS at 10:04

## 2025-04-26 RX ADMIN — SODIUM CHLORIDE 1000 ML: 9 INJECTION, SOLUTION INTRAVENOUS at 09:04

## 2025-04-26 RX ADMIN — MORPHINE SULFATE 4 MG: 4 INJECTION INTRAVENOUS at 10:04

## 2025-04-26 RX ADMIN — PHENAZOPYRIDINE 200 MG: 100 TABLET ORAL at 11:04

## 2025-04-26 RX ADMIN — HYDROMORPHONE HYDROCHLORIDE 2 MG: 2 INJECTION INTRAMUSCULAR; INTRAVENOUS; SUBCUTANEOUS at 11:04

## 2025-04-26 RX ADMIN — DIAZEPAM 5 MG: 5 INJECTION, SOLUTION INTRAMUSCULAR; INTRAVENOUS at 09:04

## 2025-04-27 VITALS
TEMPERATURE: 98 F | SYSTOLIC BLOOD PRESSURE: 112 MMHG | BODY MASS INDEX: 26.6 KG/M2 | DIASTOLIC BLOOD PRESSURE: 92 MMHG | HEIGHT: 71 IN | RESPIRATION RATE: 20 BRPM | OXYGEN SATURATION: 95 % | HEART RATE: 71 BPM | WEIGHT: 190 LBS

## 2025-04-27 NOTE — DISCHARGE INSTRUCTIONS
- Take medication as directed  - Hold your Plavix until Wednesday  - Drink plenty of water  - Follow up with your Urologist ASAP

## 2025-04-27 NOTE — ED PROVIDER NOTES
Encounter Date: 4/26/2025       History     Chief Complaint   Patient presents with    Hematuria     Pt is having bladder spasms at times and is voiding blood and blood clots. Pt states he started passing blood weeks ago but tonight was unable to void for the last three hours. He did void blood and clots just prior to triage evaluation.      DB is a 64 y/o Caucsian male.    Patient has a PMHx of Arthritis, HTN, Urinary Stricture, CAD x's 3 vessels, T2DM, HLD, MARCIA, and CABG.    Patient presents to the ED POV with c/o hematuria. Patient stated that has been an ongoing problem for the past 2 weeks. Patient stated it worsened this evening after strenuous gardening. Patient endorses the passing of several small blood clots. Patient denies any fevers, is having mild dysuria. Lower abdomin is tender to palpation. Patient did urinated while in the ED to provide urine sample and stated he feels much better now.     The history is provided by the patient.   Hematuria  This is a recurrent problem. The current episode started 1 to 4 weeks ago. Progression since onset: Worsening today after strenous active. He describes the hematuria as gross hematuria. The hematuria occurs throughout his entire urinary stream. He reports clotting at the beginning of his urine stream. His pain is at a severity of 7/10. The pain is moderate. He describes his urine color as dark red. Irritative symptoms include frequency and urgency. Irritative symptoms do not include nocturia. Obstructive symptoms include incomplete emptying. Associated symptoms include abdominal pain, bladder pain, dysuria and urinary retention. Pertinent negatives include no bone pain, chills, facial swelling, fever, flank pain, genital pain, hematospermia, hesitancy, inability to urinate, nausea, vomiting or weight loss. He is sexually active.     Review of patient's allergies indicates:   Allergen Reactions    Levofloxacin in d5w Shortness Of Breath     Past Medical History:    Diagnosis Date    Arthritis     Diabetes mellitus     Hypertension      Past Surgical History:   Procedure Laterality Date    CORONARY ARTERY BYPASS GRAFT (CABG)  01/29/2024    RENAL BIOPSY       No family history on file.  Social History[1]  Review of Systems   Constitutional: Negative.  Negative for chills, fever and weight loss.   HENT: Negative.  Negative for facial swelling.    Eyes: Negative.    Respiratory: Negative.     Cardiovascular: Negative.    Gastrointestinal:  Positive for abdominal pain. Negative for nausea and vomiting.        Lower abdomin/ supra pubic    Endocrine: Negative.    Genitourinary:  Positive for dysuria, frequency, hematuria, incomplete emptying and urgency. Negative for flank pain, hesitancy and nocturia.   Musculoskeletal: Negative.    Skin: Negative.    Allergic/Immunologic: Negative.    Neurological: Negative.    Hematological: Negative.    Psychiatric/Behavioral: Negative.         Physical Exam     Initial Vitals [04/26/25 2057]   BP Pulse Resp Temp SpO2   (!) 158/88 72 20 97.8 °F (36.6 °C) 95 %      MAP       --         Physical Exam    Nursing note and vitals reviewed.  Constitutional: Vital signs are normal. He appears well-developed and well-nourished. He is not diaphoretic. He is cooperative.  Non-toxic appearance. He does not have a sickly appearance. He does not appear ill. He appears distressed.   Cardiovascular:  Normal rate, regular rhythm, S1 normal, S2 normal, normal heart sounds, intact distal pulses and normal pulses.           Pulmonary/Chest: Effort normal and breath sounds normal.   Abdominal: Abdomen is soft and flat. Bowel sounds are normal. There is abdominal tenderness in the suprapubic area. No hernia.   No right CVA tenderness.  No left CVA tenderness. There is guarding. There is no rebound.     Neurological: He is alert and oriented to person, place, and time. He has normal strength and normal reflexes. He displays normal reflexes. No cranial nerve deficit  or sensory deficit. He displays a negative Romberg sign. GCS eye subscore is 4. GCS verbal subscore is 5. GCS motor subscore is 6.   Skin: Skin is warm, dry and intact. Capillary refill takes less than 2 seconds. No rash noted.   Psychiatric: He has a normal mood and affect. His speech is normal and behavior is normal. Judgment and thought content normal. Cognition and memory are normal.         Medical Screening Exam   See Full Note    ED Course   Procedures  Labs Reviewed   COMPREHENSIVE METABOLIC PANEL - Abnormal       Result Value    Sodium 141      Potassium 4.6      Chloride 107      CO2 21 (*)     Anion Gap 18 (*)     Glucose 198 (*)     BUN 29 (*)     Creatinine 1.37 (*)     BUN/Creatinine Ratio 21 (*)     Calcium 9.2      Total Protein 7.1      Albumin 4.0      Globulin 3.1      A/G Ratio 1.3      Bilirubin, Total 0.5      Alk Phos 86      ALT 31      AST 28      eGFR 57 (*)    APTT - Abnormal    PTT 24.6 (*)    URINALYSIS, REFLEX TO URINE CULTURE - Abnormal    Color, UA Red (*)     Clarity, UA Turbid      pH, UA 7.5      Leukocytes, UA Moderate (*)     Nitrites, UA Positive (*)     Protein, UA >=300 (*)     Glucose,  (*)     Ketones, UA 40 (*)     Urobilinogen, UA >=8.0 (*)     Bilirubin, UA Moderate (*)     Blood, UA Large (*)     Specific Gravity, UA 1.020     CBC WITH DIFFERENTIAL - Abnormal    WBC 11.40 (*)     RBC 4.75      Hemoglobin 14.3      Hematocrit 45.1      MCV 94.9      MCH 30.1      MCHC 31.7 (*)     RDW 13.0      Platelet Count 179      MPV 9.8      Neutrophils % 80.2 (*)     Lymphocytes % 12.2 (*)     Neutrophils, Abs 9.15 (*)     Lymphocytes, Absolute 1.39      Diff Type Auto      Monocytes % 6.2 (*)     Eosinophils % 1.1      Basophils % 0.3      Monocytes, Absolute 0.71      Eosinophils, Absolute 0.12      Basophils, Absolute 0.03     URINALYSIS, MICROSCOPIC - Abnormal    WBC, UA 0-5      RBC, UA Too Numerous To Count (*)     Bacteria, UA Rare      Squamous Epithelial Cells, UA  Rare     PROTIME-INR - Normal    PT 14.3      INR 1.04     CBC W/ AUTO DIFFERENTIAL    Narrative:     The following orders were created for panel order CBC auto differential.  Procedure                               Abnormality         Status                     ---------                               -----------         ------                     CBC with Differential[[id number]]       Abnormal            Final result                 Please view results for these tests on the individual orders.          Imaging Results    None          Medications   sodium chloride 0.9% bolus 1,000 mL 1,000 mL (1,000 mLs Intravenous New Bag 4/26/25 2245)   tranexamic acid (CYKLOKAPRON) 1,000 mg in 0.9% NaCl 100 mL (0 mg Intravenous Stopped 4/26/25 2145)   sodium chloride 0.9% bolus 1,000 mL 1,000 mL (0 mLs Intravenous Stopped 4/26/25 2245)   diazePAM injection 5 mg (5 mg Intravenous Given 4/26/25 2128)   cefTRIAXone injection 1 g (1 g Intravenous Given 4/26/25 2135)   morphine injection 4 mg (4 mg Intravenous Given 4/26/25 2243)   ondansetron injection 4 mg (4 mg Intravenous Given 4/26/25 2243)   phenazopyridine tablet 200 mg (200 mg Oral Given 4/26/25 2306)     Medical Decision Making  DB is a 66 y/o Caucsian male.    Patient has a PMHx of Arthritis, HTN, Urinary Stricture, CAD x's 3 vessels, T2DM, HLD, MARCIA, and CABG.    Patient presents to the ED POV with c/o hematuria. Patient stated that has been an ongoing problem for the past 2 weeks. Patient stated it worsened this evening after strenuous gardening. Patient endorses the passing of several small blood clots. Patient denies any fevers, is having mild dysuria. Lower abdomin is tender to palpation. Patient did urinated while in the ED to provide urine sample and stated he feels much better now.     The history is provided by the patient.   Hematuria  This is a recurrent problem. The current episode started 1 to 4 weeks ago. Progression since onset: Worsening today after strenous  active. He describes the hematuria as gross hematuria. The hematuria occurs throughout his entire urinary stream. He reports clotting at the beginning of his urine stream. His pain is at a severity of 7/10. The pain is moderate. He describes his urine color as dark red. Irritative symptoms include frequency and urgency. Irritative symptoms do not include nocturia. Obstructive symptoms include incomplete emptying. Associated symptoms include abdominal pain, bladder pain, dysuria and urinary retention. Pertinent negatives include no bone pain, chills, facial swelling, fever, flank pain, genital pain, hematospermia, hesitancy, inability to urinate, nausea, vomiting or weight loss. He is sexually active.       Amount and/or Complexity of Data Reviewed  Labs: ordered.    Risk  Prescription drug management.  Risk Details: Hematuria  Acute cystitis  Urinary Stricture                                       Clinical Impression:   Final diagnoses:  [N30.01] Acute cystitis with hematuria (Primary)  [R31.9] Hematuria, unspecified type  [Q64.33] Stricture, urinary meatus, congenital        ED Disposition Condition    Discharge Good          ED Prescriptions       Medication Sig Dispense Start Date End Date Auth. Provider    sulfamethoxazole-trimethoprim 800-160mg (BACTRIM DS) 800-160 mg Tab Take 1 tablet by mouth 2 (two) times daily. for 7 days 14 tablet 4/26/2025 5/3/2025 Everett Castle FNP    phenazopyridine (PYRIDIUM) 200 MG tablet Take 1 tablet (200 mg total) by mouth 3 (three) times daily. for 10 days 6 tablet 4/26/2025 5/6/2025 Everett Castle FNP    ondansetron (ZOFRAN) 4 MG tablet  (Status: Discontinued) Take 1 tablet (4 mg total) by mouth every 6 (six) hours. 12 tablet 4/26/2025 4/26/2025 Everett Castle FNP    ondansetron (ZOFRAN) 4 MG tablet Take 1 tablet (4 mg total) by mouth every 6 (six) hours. 12 tablet 4/26/2025 -- Everett Castle FNP          Follow-up Information       Follow up With Specialties Details  Why Contact Info    Urology  In 3 days                   [1]   Social History  Tobacco Use    Smoking status: Never    Smokeless tobacco: Current     Types: Chew   Substance Use Topics    Alcohol use: Yes     Comment: occasionally    Drug use: Never        Everett Castle FNP  04/26/25 0311

## 2025-06-23 RX ORDER — CARVEDILOL 6.25 MG/1
TABLET ORAL
Qty: 180 TABLET | Refills: 1 | Status: SHIPPED | OUTPATIENT
Start: 2025-06-23

## 2025-07-28 RX ORDER — RANOLAZINE 500 MG/1
500 TABLET, EXTENDED RELEASE ORAL 2 TIMES DAILY
Qty: 180 TABLET | Refills: 3 | Status: SHIPPED | OUTPATIENT
Start: 2025-07-28